# Patient Record
Sex: FEMALE | Race: WHITE | NOT HISPANIC OR LATINO | Employment: FULL TIME | URBAN - METROPOLITAN AREA
[De-identification: names, ages, dates, MRNs, and addresses within clinical notes are randomized per-mention and may not be internally consistent; named-entity substitution may affect disease eponyms.]

---

## 2017-04-04 ENCOUNTER — OFFICE VISIT (OUTPATIENT)
Dept: GASTROENTEROLOGY | Facility: CLINIC | Age: 52
End: 2017-04-04

## 2017-04-04 VITALS — WEIGHT: 151 LBS | BODY MASS INDEX: 25.78 KG/M2 | HEIGHT: 64 IN

## 2017-04-04 DIAGNOSIS — Z83.71 FAMILY HISTORY OF POLYPS IN THE COLON: ICD-10-CM

## 2017-04-04 DIAGNOSIS — K21.9 GASTROESOPHAGEAL REFLUX DISEASE, ESOPHAGITIS PRESENCE NOT SPECIFIED: Primary | ICD-10-CM

## 2017-04-04 DIAGNOSIS — R13.12 OROPHARYNGEAL DYSPHAGIA: ICD-10-CM

## 2017-04-04 PROBLEM — Z83.719 FAMILY HISTORY OF POLYPS IN THE COLON: Status: ACTIVE | Noted: 2017-04-04

## 2017-04-04 PROCEDURE — 99204 OFFICE O/P NEW MOD 45 MIN: CPT | Performed by: INTERNAL MEDICINE

## 2017-04-04 RX ORDER — OMEPRAZOLE 20 MG/1
40 CAPSULE, DELAYED RELEASE ORAL AS NEEDED
COMMUNITY

## 2017-04-04 NOTE — PROGRESS NOTES
Chief Complaint   Patient presents with   • Colonoscopy     and egd     Aracelis WATT is a 51 y.o. female who presents with a Family history of colon polyps and GERD   HPI     A 51-year-old female with history of reflux and gastritis as well as endometriosis and arthritis presenting for evaluation.  Patient reports mother and father both with colon polyps later in life.  Patient reports mother approximately late 60s and father 70s when he developed colon polyps.  Patient reports both for benign and removed and repeat colonoscopy since then for both of them were normal.  Patient is never had colonoscopy screening and presents at this time for that.  Patient does have a history of gastritis documented on upper GI endoscopy but reports of late noting intermittent heartburn which he treats with omeprazole but occasional dysphagia related to the reflux.  Patient reports difficulty swallowing both solids and liquids more in the oropharynx.  Patient denies weight loss or fever or chills denies change in appetite.    Past Medical History:   Diagnosis Date   • Arthritis    • Endometriosis        Current Outpatient Prescriptions:   •  omeprazole (priLOSEC) 20 MG capsule, Take 20 mg by mouth Daily. Prn., Disp: , Rfl:   •  Sod Picosulfate-Mag Ox-Cit Acd (PREPOPIK) 10-3.5-12 MG-GM-GM pack, Take 1 package by mouth Take As Directed., Disp: 1 each, Rfl: 0  Allergies   Allergen Reactions   • Nickel    • Penicillins    • Pineapple      Social History     Social History   • Marital status:      Spouse name: N/A   • Number of children: N/A   • Years of education: N/A     Occupational History   • Not on file.     Social History Main Topics   • Smoking status: Current Every Day Smoker   • Smokeless tobacco: Not on file   • Alcohol use Yes      Comment: socially   • Drug use: Not on file   • Sexual activity: Not on file     Other Topics Concern   • Not on file     Social History Narrative   • No narrative on file     History  reviewed. No pertinent family history.  Review of Systems   Constitutional: Negative.    HENT: Negative.    Eyes: Negative.    Respiratory: Negative.    Cardiovascular: Negative.    Gastrointestinal: Negative.    Endocrine: Negative.    Musculoskeletal: Negative.    Skin: Negative.    Allergic/Immunologic: Negative.    Hematological: Negative.      There were no vitals filed for this visit.  Physical Exam   Constitutional: She is oriented to person, place, and time. She appears well-developed and well-nourished.   HENT:   Head: Normocephalic and atraumatic.   Eyes: Pupils are equal, round, and reactive to light. No scleral icterus.   Neck: Normal range of motion.   Cardiovascular: Normal rate, regular rhythm and normal heart sounds.  Exam reveals no gallop and no friction rub.    No murmur heard.  Pulmonary/Chest: Effort normal and breath sounds normal. She has no wheezes. She has no rales.   Abdominal: Soft. Bowel sounds are normal. She exhibits no shifting dullness, no distension, no pulsatile liver, no fluid wave, no abdominal bruit, no ascites, no pulsatile midline mass and no mass. There is no hepatosplenomegaly. There is no tenderness. There is no rigidity and no guarding. No hernia.   Musculoskeletal: Normal range of motion. She exhibits no edema.   Lymphadenopathy:     She has no cervical adenopathy.   Neurological: She is alert and oriented to person, place, and time. No cranial nerve deficit.   Skin: Skin is warm and dry. No rash noted.   Psychiatric: She has a normal mood and affect. Her behavior is normal. Thought content normal.   Nursing note and vitals reviewed.    Diagnoses and all orders for this visit:    Gastroesophageal reflux disease, esophagitis presence not specified  -     Case Request; Standing  -     Case Request    Oropharyngeal dysphagia  -     Case Request; Standing  -     Case Request    Family history of polyps in the colon  -     Case Request; Standing  -     Case Request    Other  orders  -     omeprazole (priLOSEC) 20 MG capsule; Take 20 mg by mouth Daily. Prn.  -     Obtain informed consent; Standing  -     Implement Anesthesia orders day of procedure.; Standing  -     Sod Picosulfate-Mag Ox-Cit Acd (PREPOPIK) 10-3.5-12 MG-GM-GM pack; Take 1 package by mouth Take As Directed.    Patient is a 51-year-old female with family history significant for colon polyps in both mother and father as well as history of heartburn and reflux as well as gastritis with complaints of intermittent mild dysphagia and heartburn.  Patient taking when necessary omeprazole with some improvement in her heartburn symptoms but intermittent dysphagia continues.  Patient reports swallowing both solids and liquids can be an issue at times.  The above findings would recommend patient undergo colonoscopy screening as well as EGD to evaluate issues and motility possible esophagitis.  Patient scoped number of years ago for upper GI symptoms with severe gastritis and reflux at that time.

## 2017-04-12 ENCOUNTER — APPOINTMENT (OUTPATIENT)
Dept: WOMENS IMAGING | Facility: HOSPITAL | Age: 52
End: 2017-04-12

## 2017-04-12 PROCEDURE — 77067 SCR MAMMO BI INCL CAD: CPT | Performed by: RADIOLOGY

## 2018-07-11 ENCOUNTER — APPOINTMENT (OUTPATIENT)
Dept: WOMENS IMAGING | Facility: HOSPITAL | Age: 53
End: 2018-07-11

## 2018-07-11 PROCEDURE — 77067 SCR MAMMO BI INCL CAD: CPT | Performed by: RADIOLOGY

## 2018-07-11 PROCEDURE — 77063 BREAST TOMOSYNTHESIS BI: CPT | Performed by: RADIOLOGY

## 2018-09-05 ENCOUNTER — OFFICE VISIT (OUTPATIENT)
Dept: INTERNAL MEDICINE | Facility: CLINIC | Age: 53
End: 2018-09-05

## 2018-09-05 VITALS
SYSTOLIC BLOOD PRESSURE: 108 MMHG | RESPIRATION RATE: 18 BRPM | HEIGHT: 64 IN | WEIGHT: 148 LBS | BODY MASS INDEX: 25.27 KG/M2 | DIASTOLIC BLOOD PRESSURE: 84 MMHG | OXYGEN SATURATION: 97 % | HEART RATE: 76 BPM

## 2018-09-05 DIAGNOSIS — M85.80 OSTEOPENIA, UNSPECIFIED LOCATION: ICD-10-CM

## 2018-09-05 DIAGNOSIS — M19.90 ARTHRITIS: ICD-10-CM

## 2018-09-05 DIAGNOSIS — E55.9 VITAMIN D DEFICIENCY: ICD-10-CM

## 2018-09-05 DIAGNOSIS — Z78.0 MENOPAUSE: Primary | ICD-10-CM

## 2018-09-05 DIAGNOSIS — K21.9 GASTROESOPHAGEAL REFLUX DISEASE, ESOPHAGITIS PRESENCE NOT SPECIFIED: ICD-10-CM

## 2018-09-05 PROCEDURE — 99204 OFFICE O/P NEW MOD 45 MIN: CPT | Performed by: INTERNAL MEDICINE

## 2018-09-05 RX ORDER — ACETAMINOPHEN 500 MG
TABLET ORAL
COMMUNITY
End: 2019-10-15

## 2018-09-05 RX ORDER — PAROXETINE 7.5 MG/1
1 CAPSULE ORAL DAILY
Qty: 30 CAPSULE | Refills: 0 | Status: SHIPPED | OUTPATIENT
Start: 2018-09-05 | End: 2018-09-20 | Stop reason: CLARIF

## 2018-09-05 NOTE — PROGRESS NOTES
Subjective   Aracelis Haynes is a 53 y.o. female.     Arthritis   Pertinent negatives include no diarrhea, dysuria or fatigue.        54 y/o who presents to [facility] care.  Has a history of colon polyps and has been evaluated by GI.  Last C Scope and EGD in 2017.      Complains of recent changes in energy level and weight gain. Eats mainly plant based diet and having dry skin.  Increased amount of hot flashes x 5 years and seems to be worsening.  Unable to sleep at night due to night sweats.  Started estrogel last year without benefit.  Now increased dosage without benefi.    Feels like mood is changed. Does a lot of walking with work but no exercise.     She complains of hot flashes that are regular since age 37.  She did have a hysterectomy around age [age].  She did have retention of R ovary. She also thinks she is getting down more consistentlly in her mood.  Tried wellbutrin in past to couple with smoking cessation, but was intolerant. No SI or HI.  Not actively trying to stop smoking.         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  Very active at work, does not exercise outside of work.     Smokes every day.  See Social history.    Had both Flu A and B this past January and has had worsening arthritis since that time.  All the joints hurt and it changes based on the day.  Denies morning stiffness.  Hx of thyroid nodules noted on US, never had any malignancy therein.     Takes omeprazole PRN and Tums PRN for heartburn.  Follows with [facility]. No sicca symptoms described.     Takes naproxen and ibuprofen for arthritis.  Does not take acetaminophen prn as well.    She had recent DexA through OB, osteopenia.  Not yet on calcium and vitamin D.      The following portions of the patient's history were reviewed and updated as appropriate: allergies, current medications, past family history, past medical history, past social history, past surgical history and problem list.    Review  of Systems   Constitutional: Negative.  Negative for fatigue and unexpected weight change.   HENT: Negative.  Negative for congestion, rhinorrhea and sore throat.    Eyes: Negative.    Respiratory: Negative.  Negative for cough and shortness of breath.    Cardiovascular: Negative.  Negative for chest pain and palpitations.   Gastrointestinal: Positive for abdominal pain, constipation and nausea. Negative for diarrhea.   Genitourinary: Negative.  Negative for difficulty urinating, dysuria and flank pain.   Musculoskeletal: Positive for arthritis.   Neurological: Negative.  Negative for dizziness, syncope and light-headedness.   Psychiatric/Behavioral: Negative.        Objective   Physical Exam   Constitutional: She is oriented to person, place, and time. She appears well-developed and well-nourished.   HENT:   Head: Normocephalic and atraumatic.   Right Ear: External ear normal.   Left Ear: External ear normal.   Eyes: Pupils are equal, round, and reactive to light. EOM are normal.   Neck: Normal range of motion. Neck supple. No JVD present. No tracheal deviation present. No thyromegaly present.   Cardiovascular: Normal rate, regular rhythm and normal heart sounds.  Exam reveals no friction rub.    No murmur heard.  Pulmonary/Chest: Effort normal and breath sounds normal.   Lymphadenopathy:     She has no cervical adenopathy.   Neurological: She is alert and oriented to person, place, and time.   Skin: Skin is warm and dry.   Psychiatric: She has a normal mood and affect. Her behavior is normal.   Vitals reviewed.      Assessment/Plan   Aracelis was seen today for establish care and arthritis.    Diagnoses and all orders for this visit:    Menopause  -     PARoxetine Mesylate 7.5 MG capsule; Take 1 tablet by mouth Daily.    Arthritis  -     TSH Rfx On Abnormal To Free T4  -     Comprehensive Metabolic Panel  -     Sedimentation rate, automated  -     C-reactive protein  -     Cyclic Citrul Peptide Antibody, IgG /  IgA  -     VASQUEZ    Gastroesophageal reflux disease, esophagitis presence not specified    Osteopenia, unspecified location    Vitamin D deficiency  -     Vitamin D 25 Hydroxy      Arthritis  - Pt with intermittent symptoms and no evidence of joint destruction on exam  - Hx consistent with sausage digit of R hand  - No skin evidence of psoriasis.   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  - Will perform serologic workup for inflammatory  arthritis.    Trial brisdelle for now, return 4 weeks to see if she has imporved  Cymbalta will be an option for her next time if not doing well, reiterated need for pain control.   Thyroid labs reviewed and normal    Menopause  - Brisdelle for now and limit estrogen replacement due to side effects  - Continue to follow with OBGYN    Osteopenia  - Pt on calcium and Vit D  - FRAX calculated at 10% risk of major fracture  - DEXA q2 years    HCM  - Pt UTD on pap/mammogram and follows with OBGYN (S/p [facility])  - Needs C Scope and EGD  - Immunizations UTD    Antonio Way MD  MEd/Peds PGY-4      Patient seen and examined with resident physician, as well as independently of resident physician. Agree with assessment and plan.

## 2018-09-07 LAB
25(OH)D3+25(OH)D2 SERPL-MCNC: 28.9 NG/ML
ALBUMIN SERPL-MCNC: 4.5 G/DL (ref 3.5–5.2)
ALBUMIN/GLOB SERPL: 2 G/DL
ALP SERPL-CCNC: 86 U/L (ref 40–129)
ALT SERPL-CCNC: 15 U/L (ref 5–33)
ANA SER QL: NEGATIVE
AST SERPL-CCNC: 17 U/L (ref 5–32)
BILIRUB SERPL-MCNC: 0.3 MG/DL (ref 0.2–1.2)
BUN SERPL-MCNC: 9 MG/DL (ref 6–20)
BUN/CREAT SERPL: 11.4 (ref 7–25)
CALCIUM SERPL-MCNC: 9.5 MG/DL (ref 8.6–10.5)
CCP IGA+IGG SERPL IA-ACNC: 9 UNITS (ref 0–19)
CHLORIDE SERPL-SCNC: 103 MMOL/L (ref 98–107)
CO2 SERPL-SCNC: 27.8 MMOL/L (ref 22–29)
CREAT SERPL-MCNC: 0.79 MG/DL (ref 0.57–1)
CRP SERPL-MCNC: 0.05 MG/DL (ref 0–0.5)
ERYTHROCYTE [SEDIMENTATION RATE] IN BLOOD BY WESTERGREN METHOD: 4 MM/HR (ref 0–20)
GLOBULIN SER CALC-MCNC: 2.2 GM/DL
GLUCOSE SERPL-MCNC: 86 MG/DL (ref 65–99)
POTASSIUM SERPL-SCNC: 4.5 MMOL/L (ref 3.5–5.2)
PROT SERPL-MCNC: 6.7 G/DL (ref 6–8.5)
SODIUM SERPL-SCNC: 143 MMOL/L (ref 136–145)
TSH SERPL DL<=0.005 MIU/L-ACNC: 0.48 MIU/ML (ref 0.27–4.2)

## 2018-09-20 RX ORDER — PAROXETINE 10 MG/1
10 TABLET, FILM COATED ORAL EVERY MORNING
Qty: 30 TABLET | Refills: 2 | Status: SHIPPED | OUTPATIENT
Start: 2018-09-20 | End: 2019-10-15

## 2019-07-31 ENCOUNTER — APPOINTMENT (OUTPATIENT)
Dept: WOMENS IMAGING | Facility: HOSPITAL | Age: 54
End: 2019-07-31

## 2019-07-31 PROCEDURE — 77063 BREAST TOMOSYNTHESIS BI: CPT | Performed by: RADIOLOGY

## 2019-07-31 PROCEDURE — 77067 SCR MAMMO BI INCL CAD: CPT | Performed by: RADIOLOGY

## 2019-10-15 ENCOUNTER — APPOINTMENT (OUTPATIENT)
Dept: GENERAL RADIOLOGY | Facility: HOSPITAL | Age: 54
End: 2019-10-15

## 2019-10-15 ENCOUNTER — HOSPITAL ENCOUNTER (EMERGENCY)
Facility: HOSPITAL | Age: 54
Discharge: HOME | End: 2019-10-15
Attending: EMERGENCY MEDICINE | Admitting: EMERGENCY MEDICINE

## 2019-10-15 VITALS
TEMPERATURE: 96.4 F | BODY MASS INDEX: 26.88 KG/M2 | DIASTOLIC BLOOD PRESSURE: 71 MMHG | RESPIRATION RATE: 16 BRPM | WEIGHT: 151.7 LBS | SYSTOLIC BLOOD PRESSURE: 109 MMHG | OXYGEN SATURATION: 98 % | HEIGHT: 63 IN | HEART RATE: 73 BPM

## 2019-10-15 DIAGNOSIS — R07.89 CHEST WALL PAIN: Primary | ICD-10-CM

## 2019-10-15 LAB
ALBUMIN SERPL-MCNC: 4.4 G/DL (ref 3.5–5.2)
ALBUMIN/GLOB SERPL: 2.2 G/DL
ALP SERPL-CCNC: 76 U/L (ref 39–117)
ALT SERPL W P-5'-P-CCNC: 11 U/L (ref 1–33)
ANION GAP SERPL CALCULATED.3IONS-SCNC: 11.4 MMOL/L (ref 5–15)
AST SERPL-CCNC: 12 U/L (ref 1–32)
BASOPHILS # BLD AUTO: 0.07 10*3/MM3 (ref 0–0.2)
BASOPHILS NFR BLD AUTO: 1 % (ref 0–1.5)
BILIRUB SERPL-MCNC: 0.3 MG/DL (ref 0.2–1.2)
BUN BLD-MCNC: 12 MG/DL (ref 6–20)
BUN/CREAT SERPL: 14.6 (ref 7–25)
CALCIUM SPEC-SCNC: 8.9 MG/DL (ref 8.6–10.5)
CHLORIDE SERPL-SCNC: 101 MMOL/L (ref 98–107)
CO2 SERPL-SCNC: 26.6 MMOL/L (ref 22–29)
CREAT BLD-MCNC: 0.82 MG/DL (ref 0.57–1)
D DIMER PPP FEU-MCNC: 0.27 MCGFEU/ML (ref 0–0.49)
DEPRECATED RDW RBC AUTO: 41.9 FL (ref 37–54)
EOSINOPHIL # BLD AUTO: 0.17 10*3/MM3 (ref 0–0.4)
EOSINOPHIL NFR BLD AUTO: 2.4 % (ref 0.3–6.2)
ERYTHROCYTE [DISTWIDTH] IN BLOOD BY AUTOMATED COUNT: 12.1 % (ref 12.3–15.4)
GFR SERPL CREATININE-BSD FRML MDRD: 73 ML/MIN/1.73
GLOBULIN UR ELPH-MCNC: 2 GM/DL
GLUCOSE BLD-MCNC: 94 MG/DL (ref 65–99)
HCT VFR BLD AUTO: 42.8 % (ref 34–46.6)
HGB BLD-MCNC: 14.4 G/DL (ref 12–15.9)
HOLD SPECIMEN: NORMAL
HOLD SPECIMEN: NORMAL
IMM GRANULOCYTES # BLD AUTO: 0.02 10*3/MM3 (ref 0–0.05)
IMM GRANULOCYTES NFR BLD AUTO: 0.3 % (ref 0–0.5)
LYMPHOCYTES # BLD AUTO: 1.74 10*3/MM3 (ref 0.7–3.1)
LYMPHOCYTES NFR BLD AUTO: 24.8 % (ref 19.6–45.3)
MCH RBC QN AUTO: 31.5 PG (ref 26.6–33)
MCHC RBC AUTO-ENTMCNC: 33.6 G/DL (ref 31.5–35.7)
MCV RBC AUTO: 93.7 FL (ref 79–97)
MONOCYTES # BLD AUTO: 0.55 10*3/MM3 (ref 0.1–0.9)
MONOCYTES NFR BLD AUTO: 7.8 % (ref 5–12)
NEUTROPHILS # BLD AUTO: 4.47 10*3/MM3 (ref 1.7–7)
NEUTROPHILS NFR BLD AUTO: 63.7 % (ref 42.7–76)
NRBC BLD AUTO-RTO: 0 /100 WBC (ref 0–0.2)
PLATELET # BLD AUTO: 241 10*3/MM3 (ref 140–450)
PMV BLD AUTO: 10.5 FL (ref 6–12)
POTASSIUM BLD-SCNC: 4.3 MMOL/L (ref 3.5–5.2)
PROT SERPL-MCNC: 6.4 G/DL (ref 6–8.5)
RBC # BLD AUTO: 4.57 10*6/MM3 (ref 3.77–5.28)
SODIUM BLD-SCNC: 139 MMOL/L (ref 136–145)
TROPONIN T SERPL-MCNC: <0.01 NG/ML (ref 0–0.03)
WBC NRBC COR # BLD: 7.02 10*3/MM3 (ref 3.4–10.8)
WHOLE BLOOD HOLD SPECIMEN: NORMAL
WHOLE BLOOD HOLD SPECIMEN: NORMAL

## 2019-10-15 PROCEDURE — 25010000002 KETOROLAC TROMETHAMINE PER 15 MG: Performed by: EMERGENCY MEDICINE

## 2019-10-15 PROCEDURE — 99284 EMERGENCY DEPT VISIT MOD MDM: CPT

## 2019-10-15 PROCEDURE — 93005 ELECTROCARDIOGRAM TRACING: CPT

## 2019-10-15 PROCEDURE — 84484 ASSAY OF TROPONIN QUANT: CPT | Performed by: EMERGENCY MEDICINE

## 2019-10-15 PROCEDURE — 93010 ELECTROCARDIOGRAM REPORT: CPT | Performed by: INTERNAL MEDICINE

## 2019-10-15 PROCEDURE — 96374 THER/PROPH/DIAG INJ IV PUSH: CPT

## 2019-10-15 PROCEDURE — 80053 COMPREHEN METABOLIC PANEL: CPT | Performed by: EMERGENCY MEDICINE

## 2019-10-15 PROCEDURE — 85025 COMPLETE CBC W/AUTO DIFF WBC: CPT | Performed by: EMERGENCY MEDICINE

## 2019-10-15 PROCEDURE — 85379 FIBRIN DEGRADATION QUANT: CPT | Performed by: EMERGENCY MEDICINE

## 2019-10-15 PROCEDURE — 71046 X-RAY EXAM CHEST 2 VIEWS: CPT

## 2019-10-15 PROCEDURE — 93005 ELECTROCARDIOGRAM TRACING: CPT | Performed by: EMERGENCY MEDICINE

## 2019-10-15 RX ORDER — SODIUM CHLORIDE 0.9 % (FLUSH) 0.9 %
10 SYRINGE (ML) INJECTION AS NEEDED
Status: DISCONTINUED | OUTPATIENT
Start: 2019-10-15 | End: 2019-10-15 | Stop reason: HOSPADM

## 2019-10-15 RX ORDER — IBUPROFEN 600 MG/1
600 TABLET ORAL EVERY 8 HOURS PRN
Qty: 21 TABLET | Refills: 0 | Status: SHIPPED | OUTPATIENT
Start: 2019-10-15 | End: 2021-04-13 | Stop reason: HOSPADM

## 2019-10-15 RX ORDER — KETOROLAC TROMETHAMINE 15 MG/ML
15 INJECTION, SOLUTION INTRAMUSCULAR; INTRAVENOUS ONCE
Status: COMPLETED | OUTPATIENT
Start: 2019-10-15 | End: 2019-10-15

## 2019-10-15 RX ADMIN — KETOROLAC TROMETHAMINE 15 MG: 15 INJECTION, SOLUTION INTRAMUSCULAR; INTRAVENOUS at 12:01

## 2019-10-15 NOTE — ED PROVIDER NOTES
" EMERGENCY DEPARTMENT ENCOUNTER    CHIEF COMPLAINT  Chief Complaint: Chest pain  History given by: Pt  History limited by: none  Room Number:   PMD: Yovanny Choi MD      HPI:  Pt is a 54 y.o. female who presents complaining of intermittent \"aching\" right upper chest pain that radiates into her right upper shoulder that began yesterday at 1630. She states it lasted until  and came back earlier today while at work. It is worse with a deep inspiration and cough. It is also worse with bending to the right. She states she recently finished moving homes. She also states she has had a productive cough with clear/yellow sputum for two weeks. She was diagnosed with a URI and was started on Zithromax. She reports right calf pain for four weeks but denies any leg swelling, nausea, vomiting, or fever. She reports FHx of father and mother with heart disease. She states she smokes half a pack a day.       PAST MEDICAL HISTORY  Active Ambulatory Problems     Diagnosis Date Noted   • GERD (gastroesophageal reflux disease) 2017   • Oropharyngeal dysphagia 2017   • Family history of polyps in the colon 2017   • Menopause 2018   • Arthritis 2018   • Osteopenia 2018   • Vitamin D deficiency 2018     Resolved Ambulatory Problems     Diagnosis Date Noted   • No Resolved Ambulatory Problems     Past Medical History:   Diagnosis Date   • Arthritis    • Endometriosis        PAST SURGICAL HISTORY  Past Surgical History:   Procedure Laterality Date   •  SECTION     •  SECTION     • CHOLECYSTECTOMY     • FULGURATION ENDOMETRIOSIS     • HYSTERECTOMY     • TUBAL ABDOMINAL LIGATION         FAMILY HISTORY  Family History   Problem Relation Age of Onset   • Heart disease Mother    • Hypertension Mother    • Depression Mother    • Heart disease Father    • Hypertension Father    • Kidney nephrosis Father        SOCIAL HISTORY  Social History     Socioeconomic History   • Marital " status:      Spouse name: Not on file   • Number of children: Not on file   • Years of education: Not on file   • Highest education level: Not on file   Tobacco Use   • Smoking status: Current Every Day Smoker     Packs/day: 0.50     Types: Cigarettes   • Smokeless tobacco: Never Used   Substance and Sexual Activity   • Alcohol use: Yes     Comment: socially       ALLERGIES  Penicillins; Pineapple; and Nickel    REVIEW OF SYSTEMS  Review of Systems   Constitutional: Negative for fever.   HENT: Negative for sore throat.    Eyes: Negative.    Respiratory: Positive for cough. Negative for shortness of breath.    Cardiovascular: Positive for chest pain. Negative for leg swelling.   Gastrointestinal: Negative for abdominal pain, diarrhea and vomiting.   Genitourinary: Negative for dysuria.   Musculoskeletal: Positive for myalgias (right calf pain for four weeks). Negative for neck pain.   Skin: Negative for rash.   Allergic/Immunologic: Negative.    Neurological: Negative for weakness, numbness and headaches.   Hematological: Negative.    Psychiatric/Behavioral: Negative.    All other systems reviewed and are negative.      PHYSICAL EXAM  ED Triage Vitals   Temp Heart Rate Resp BP SpO2   10/15/19 0927 10/15/19 0927 10/15/19 0927 10/15/19 1032 10/15/19 0927   96.4 °F (35.8 °C) 99 16 121/75 97 %      Temp src Heart Rate Source Patient Position BP Location FiO2 (%)   10/15/19 0927 10/15/19 0927 -- -- --   Tympanic Monitor          Physical Exam   Constitutional: She is oriented to person, place, and time. No distress.   HENT:   Head: Normocephalic and atraumatic.   Mouth/Throat: Oropharynx is clear and moist. No oropharyngeal exudate.   Eyes: EOM are normal. Pupils are equal, round, and reactive to light.   Neck: Normal range of motion. Neck supple.   Cardiovascular: Normal rate, regular rhythm and normal heart sounds.   No murmur heard.  Pulmonary/Chest: Effort normal and breath sounds normal. No respiratory  distress. She has no wheezes. She has no rales.   Reproducible with palpation of right chest wall and moving her right shoulder against resistance.    Abdominal: Soft. Bowel sounds are normal. She exhibits no distension. There is no tenderness.   Musculoskeletal: Normal range of motion. She exhibits no edema (pedal) or tenderness (calf).   No jyoti's sign   Lymphadenopathy:     She has no cervical adenopathy.   Neurological: She is alert and oriented to person, place, and time. She has normal sensation and normal strength.   Skin: Skin is warm and dry. No rash noted.   Psychiatric: Mood and affect normal.   Nursing note and vitals reviewed.      LAB RESULTS  Lab Results (last 24 hours)     Procedure Component Value Units Date/Time    CBC & Differential [[id number]] Collected:  10/15/19 1041    Specimen:  Blood Updated:  10/15/19 1207    Narrative:       The following orders were created for panel order CBC & Differential.  Procedure                               Abnormality         Status                     ---------                               -----------         ------                     CBC Auto Differential[[id number]]        Abnormal            Final result                 Please view results for these tests on the individual orders.    Comprehensive Metabolic Panel [[id number]] Collected:  10/15/19 1041    Specimen:  Blood Updated:  10/15/19 1158     Glucose 94 mg/dL      BUN 12 mg/dL      Creatinine 0.82 mg/dL      Sodium 139 mmol/L      Potassium 4.3 mmol/L      Chloride 101 mmol/L      CO2 26.6 mmol/L      Calcium 8.9 mg/dL      Total Protein 6.4 g/dL      Albumin 4.40 g/dL      ALT (SGPT) 11 U/L      AST (SGOT) 12 U/L      Alkaline Phosphatase 76 U/L      Total Bilirubin 0.3 mg/dL      eGFR Non African Amer 73 mL/min/1.73      Globulin 2.0 gm/dL      A/G Ratio 2.2 g/dL      BUN/Creatinine Ratio 14.6     Anion Gap 11.4 mmol/L     Narrative:       GFR Normal >60  Chronic Kidney Disease <60  Kidney Failure  <15    Troponin [[id number]]  (Normal) Collected:  10/15/19 1041    Specimen:  Blood Updated:  10/15/19 1202     Troponin T <0.010 ng/mL     Narrative:       Troponin T Reference Range:  <= 0.03 ng/mL-   Negative for AMI  >0.03 ng/mL-     Abnormal for myocardial necrosis.  Clinicians would have to utilize clinical acumen, EKG, Troponin and serial changes to determine if it is an Acute Myocardial Infarction or myocardial injury due to an underlying chronic condition.     D-dimer, Quantitative [[id number]]  (Normal) Collected:  10/15/19 1041    Specimen:  Blood Updated:  10/15/19 1215     D-Dimer, Quantitative 0.27 MCGFEU/mL     Narrative:       The Stago D-Dimer test used in conjunction with a clinical pretest probability (PTP) assessment model, has been approved by the FDA to rule out the presence of venous thromboembolism (VTE) in outpatients suspected of deep venous thrombosis (DVT) or pulmonary embolism (PE). The cut-off for negative predictive value is <0.50 MCGFEU/mL.    CBC Auto Differential [[id number]]  (Abnormal) Collected:  10/15/19 1041    Specimen:  Blood Updated:  10/15/19 1207     WBC 7.02 10*3/mm3      RBC 4.57 10*6/mm3      Hemoglobin 14.4 g/dL      Hematocrit 42.8 %      MCV 93.7 fL      MCH 31.5 pg      MCHC 33.6 g/dL      RDW 12.1 %      RDW-SD 41.9 fl      MPV 10.5 fL      Platelets 241 10*3/mm3      Neutrophil % 63.7 %      Lymphocyte % 24.8 %      Monocyte % 7.8 %      Eosinophil % 2.4 %      Basophil % 1.0 %      Immature Grans % 0.3 %      Neutrophils, Absolute 4.47 10*3/mm3      Lymphocytes, Absolute 1.74 10*3/mm3      Monocytes, Absolute 0.55 10*3/mm3      Eosinophils, Absolute 0.17 10*3/mm3      Basophils, Absolute 0.07 10*3/mm3      Immature Grans, Absolute 0.02 10*3/mm3      nRBC 0.0 /100 WBC           I ordered the above labs and reviewed the results    RADIOLOGY  XR Chest 2 View   Final Result       negative acute    I ordered the above noted radiological studies. Interpreted by  radiologist. Discussed with radiologist (). Reviewed by me in PACS.       PROCEDURES  Procedures    HEART SCORE    History Slightly or non-suspicious (0)  ECG Normal (0)  Age 46-65 (1)  Risk factors 1 or 2 (1)  Troponin < or = Normal limit (0)    This patient's HEART score is 2    HEART Score Key:  Scores 0-3: 0.9-1.7% risk of adverse cardiac event. In the HEART Score study, these patients were discharged (0.99% in the retrospective study, 1.7% in the prospective study)  Scores 4-6: 12-16.6% risk of adverse cardiac event. In the HEART Score study, these patients were admitted to the hospital. (11.6% retrospective, 16.6% prospective)  Scores ?7: 50-65% risk of adverse cardiac event. In the HEART Score study, these patients were candidates for early invasive measures. (65.2% retrospective, 50.1% prospective)    EKG          EKG time: 0931  Rhythm/Rate: NSR, 83  P waves and LA: short DANGELO  QRS, axis: nml   ST and T waves: nml     Interpreted Contemporaneously by me, independently viewed  No prior    PROGRESS AND CONSULTS       1304 Rechecked pt. Pt is resting comfortably in NAD. Notified pt of unremarkable lab work results, including negative troponin and D-Dimer; unremarkable EKG results; and negative acute CXR results. Her chest pain is reproducible with palpation of right chest and is worse with movement. 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 Informed pt concern for chest wall pain. Discussed the plan to discharge the pt home with prescriptions for NSAIDS. I instructed the pt rest and take it easy and to f/u with her PCP. Pt understands and agrees with the plan, all questions answered.      MEDICAL DECISION MAKING  Results were reviewed/discussed with the patient and they were also made aware of online access. Pt also made aware that some labs, such as cultures, will not be resulted during ER visit and follow up with PMD is necessary.     MDM  Number of Diagnoses or Management Options   Chest wall pain:      Amount  and/or Complexity of Data Reviewed  Tests in the radiology section of CPT®:  ordered and reviewed (CXR negative )  Tests in the medicine section of CPT®:  ordered and reviewed (See EKG results in procedure. )           DIAGNOSIS  Final diagnoses:   Chest wall pain       DISPOSITION  DISCHARGE    Patient discharged in stable condition.    Reviewed implications of results, diagnosis, meds, responsibility to follow up, warning signs and symptoms of possible worsening, potential complications and reasons to return to ER.    Patient/Family voiced understanding of above instructions.    Discussed plan for discharge, as there is no emergent indication for admission. Patient referred to primary care provider for BP management due to today's BP. Pt/family is agreeable and understands need for follow up and repeat testing.  Pt is aware that discharge does not mean that nothing is wrong but it indicates no emergency is present that requires admission and they must continue care with follow-up as given below or physician of their choice.     FOLLOW-UP  Yovanny Choi MD  [address] [address]  [phone]    Schedule an appointment as soon as possible for a visit            Medication List      New Prescriptions    ibuprofen 600 MG tablet  Commonly known as:  ADVIL,MOTRIN  Take 1 tablet by mouth Every 8 (Eight) Hours As Needed for Moderate Pain .              Latest Documented Vital Signs:  As of 1:34 PM  BP- 109/71 HR- 73 Temp- 96.4 °F (35.8 °C) (Tympanic) O2 sat- 98%    --  Documentation assistance provided by vincent Hampton for Dr. Espana.  Information recorded by the scribe was done at my direction and has been verified and validated by me.            Caleb Hampton  10/15/19 1808    Fredi Espana MD  10/15/19 1921

## 2020-08-05 ENCOUNTER — APPOINTMENT (OUTPATIENT)
Dept: WOMENS IMAGING | Facility: HOSPITAL | Age: 55
End: 2020-08-05

## 2020-08-05 PROCEDURE — 77063 BREAST TOMOSYNTHESIS BI: CPT | Performed by: RADIOLOGY

## 2020-08-05 PROCEDURE — 77067 SCR MAMMO BI INCL CAD: CPT | Performed by: RADIOLOGY

## 2021-01-05 ENCOUNTER — IMMUNIZATION (OUTPATIENT)
Dept: VACCINE CLINIC | Facility: HOSPITAL | Age: 56
End: 2021-01-05

## 2021-01-05 PROCEDURE — 0001A: CPT | Performed by: INTERNAL MEDICINE

## 2021-01-05 PROCEDURE — 91300 HC SARSCOV02 VAC 30MCG/0.3ML IM: CPT | Performed by: INTERNAL MEDICINE

## 2021-01-26 ENCOUNTER — IMMUNIZATION (OUTPATIENT)
Dept: VACCINE CLINIC | Facility: HOSPITAL | Age: 56
End: 2021-01-26

## 2021-01-26 PROCEDURE — 0002A: CPT | Performed by: INTERNAL MEDICINE

## 2021-01-26 PROCEDURE — 91300 HC SARSCOV02 VAC 30MCG/0.3ML IM: CPT | Performed by: INTERNAL MEDICINE

## 2021-02-10 NOTE — PATIENT INSTRUCTIONS
Assessment/Plan   Aracelis was seen today for establish care and arthritis.    Diagnoses and all orders for this visit:    Menopause  -     PARoxetine Mesylate 7.5 MG capsule; Take 1 tablet by mouth Daily.    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  Trial brisdelle for now, return 4 weeks to see if she has imporved  Cymbalta will be an option for her next time if not doing well, reiterated need for pain control.        warm

## 2021-04-12 ENCOUNTER — HOSPITAL ENCOUNTER (EMERGENCY)
Dept: CARDIOLOGY | Facility: HOSPITAL | Age: 56
Discharge: HOME | End: 2021-04-12

## 2021-04-12 ENCOUNTER — HOSPITAL ENCOUNTER (EMERGENCY)
Facility: HOSPITAL | Age: 56
Discharge: HOME | End: 2021-04-12
Attending: EMERGENCY MEDICINE

## 2021-04-12 ENCOUNTER — APPOINTMENT (OUTPATIENT)
Dept: GENERAL RADIOLOGY | Facility: HOSPITAL | Age: 56
End: 2021-04-12

## 2021-04-12 ENCOUNTER — LAB (OUTPATIENT)
Dept: LAB | Facility: HOSPITAL | Age: 56
End: 2021-04-12

## 2021-04-12 ENCOUNTER — TRANSCRIBE ORDERS (OUTPATIENT)
Dept: ADMINISTRATIVE | Facility: HOSPITAL | Age: 56
End: 2021-04-12

## 2021-04-12 VITALS
RESPIRATION RATE: 18 BRPM | TEMPERATURE: 97.5 F | OXYGEN SATURATION: 100 % | HEART RATE: 80 BPM | WEIGHT: 155 LBS | BODY MASS INDEX: 27.46 KG/M2 | DIASTOLIC BLOOD PRESSURE: 76 MMHG | HEIGHT: 63 IN | SYSTOLIC BLOOD PRESSURE: 116 MMHG

## 2021-04-12 DIAGNOSIS — Z01.818 PRE-OP EXAM: Primary | ICD-10-CM

## 2021-04-12 DIAGNOSIS — Z01.818 PRE-OP EXAM: ICD-10-CM

## 2021-04-12 DIAGNOSIS — M20.012 MALLET DEFORMITY OF LEFT LITTLE FINGER: Primary | ICD-10-CM

## 2021-04-12 DIAGNOSIS — S62.637A DISPLACED FRACTURE OF DISTAL PHALANX OF LEFT LITTLE FINGER, INITIAL ENCOUNTER FOR CLOSED FRACTURE: ICD-10-CM

## 2021-04-12 LAB
ALBUMIN SERPL-MCNC: 4.4 G/DL (ref 3.5–5.2)
ALBUMIN/GLOB SERPL: 2 G/DL
ALP SERPL-CCNC: 85 U/L (ref 39–117)
ALT SERPL W P-5'-P-CCNC: 13 U/L (ref 1–33)
ANION GAP SERPL CALCULATED.3IONS-SCNC: 13.7 MMOL/L (ref 5–15)
AST SERPL-CCNC: 16 U/L (ref 1–32)
BILIRUB SERPL-MCNC: 0.4 MG/DL (ref 0–1.2)
BUN SERPL-MCNC: 10 MG/DL (ref 6–20)
BUN/CREAT SERPL: 13.2 (ref 7–25)
CALCIUM SPEC-SCNC: 9.4 MG/DL (ref 8.6–10.5)
CHLORIDE SERPL-SCNC: 106 MMOL/L (ref 98–107)
CO2 SERPL-SCNC: 23.3 MMOL/L (ref 22–29)
CREAT SERPL-MCNC: 0.76 MG/DL (ref 0.57–1)
GFR SERPL CREATININE-BSD FRML MDRD: 80 ML/MIN/1.73
GLOBULIN UR ELPH-MCNC: 2.2 GM/DL
GLUCOSE SERPL-MCNC: 92 MG/DL (ref 65–99)
POTASSIUM SERPL-SCNC: 4.5 MMOL/L (ref 3.5–5.2)
PROT SERPL-MCNC: 6.6 G/DL (ref 6–8.5)
QT INTERVAL: 377 MS
SARS-COV-2 ORF1AB RESP QL NAA+PROBE: NOT DETECTED
SODIUM SERPL-SCNC: 143 MMOL/L (ref 136–145)

## 2021-04-12 PROCEDURE — U0004 COV-19 TEST NON-CDC HGH THRU: HCPCS

## 2021-04-12 PROCEDURE — 80053 COMPREHEN METABOLIC PANEL: CPT

## 2021-04-12 PROCEDURE — 36415 COLL VENOUS BLD VENIPUNCTURE: CPT

## 2021-04-12 PROCEDURE — 93010 ELECTROCARDIOGRAM REPORT: CPT | Performed by: INTERNAL MEDICINE

## 2021-04-12 PROCEDURE — 99283 EMERGENCY DEPT VISIT LOW MDM: CPT

## 2021-04-12 PROCEDURE — 93005 ELECTROCARDIOGRAM TRACING: CPT | Performed by: ORTHOPAEDIC SURGERY

## 2021-04-12 PROCEDURE — 73140 X-RAY EXAM OF FINGER(S): CPT

## 2021-04-12 PROCEDURE — C9803 HOPD COVID-19 SPEC COLLECT: HCPCS

## 2021-04-12 NOTE — ED PROVIDER NOTES
" EMERGENCY DEPARTMENT ENCOUNTER    Room Number:    Date seen:  2021  Time seen: 06:58 EDT  PCP: Yovanny Choi MD  Historian: patient      HPI:  Chief Complaint: left pinky finger pain    A complete HPI/ROS/PMH/PSH/SH/FH are unobtainable due to: none    Context: Aracelis Haynes is a 55 y.o. female who presents to the ED for evaluation of pain and swelling in her left pinky finger that began around 5:00 this morning acutely when she was trying to clean up an accident that her dog had at home and as she was scrubbing the floor with the towel she heard her finger \"pop.\"  She gradually developed pain and swelling in the area since.  It is worse with palpation and attempts at range of motion and holding it still makes it feel somewhat better.  Pain is moderate, no other injuries at this time.  No prior injuries to this area, she is right-hand dominant.        PAST MEDICAL HISTORY  Active Ambulatory Problems     Diagnosis Date Noted   • GERD (gastroesophageal reflux disease) 2017   • Oropharyngeal dysphagia 2017   • Family history of polyps in the colon 2017   • Menopause 2018   • Arthritis 2018   • Osteopenia 2018   • Vitamin D deficiency 2018     Resolved Ambulatory Problems     Diagnosis Date Noted   • No Resolved Ambulatory Problems     Past Medical History:   Diagnosis Date   • Endometriosis          PAST SURGICAL HISTORY  Past Surgical History:   Procedure Laterality Date   •  SECTION     •  SECTION     • CHOLECYSTECTOMY     • FULGURATION ENDOMETRIOSIS     • HYSTERECTOMY     • TUBAL ABDOMINAL LIGATION           FAMILY HISTORY  Family History   Problem Relation Age of Onset   • Heart disease Mother    • Hypertension Mother    • Depression Mother    • Heart disease Father    • Hypertension Father    • Kidney nephrosis Father          SOCIAL HISTORY  Social History     Socioeconomic History   • Marital status:      Spouse name: Not on file   • " Number of children: Not on file   • Years of education: Not on file   • Highest education level: Not on file   Tobacco Use   • Smoking status: Current Every Day Smoker     Packs/day: 0.50     Types: Cigarettes   • Smokeless tobacco: Never Used   Vaping Use   • Vaping Use: Never used   Substance and Sexual Activity   • Alcohol use: Yes     Comment: socially   • Drug use: Defer   • Sexual activity: Defer         ALLERGIES  Penicillins, Pineapple, and Nickel        REVIEW OF SYSTEMS  Review of Systems     All systems reviewed and negative except for those discussed in HPI.       PHYSICAL EXAM  ED Triage Vitals   Temp Heart Rate Resp BP SpO2   04/12/21 0617 04/12/21 0617 04/12/21 0617 04/12/21 0622 04/12/21 0617   97.5 °F (36.4 °C) 90 16 120/92 100 %      Temp src Heart Rate Source Patient Position BP Location FiO2 (%)   04/12/21 0617 04/12/21 0617 -- -- --   Tympanic Monitor            GENERAL: not distressed  HENT: atraumatic  EYES: no scleral icterus  CV:  regular rate  RESPIRATORY: normal effort  ABDOMEN: Nondistended  MUSCULOSKELETAL: Mallet deformity of the left fifth finger with tenderness edema and mild ecchymosis at the left fifth finger DIP.  Sensation intact distally, cap refill is brisk.  There is no other tenderness to any of the fingers hand or wrist of the left upper extremity.  Radial pulse is 2+.  NEURO: alert, moves all extremities, follows commands  SKIN: warm, dry    Vital signs and nursing notes reviewed.        RADIOLOGY  XR Finger 2+ View Left   Final Result   Dorsal avulsion fracture at the base of the left fifth digit   distal phalanx.       This report was finalized on 4/12/2021 7:43 AM by Dr. Jewel Lee M.D.              I ordered the above noted radiological studies. Reviewed by me and discussed with radiologist.  See dictation for official radiology interpretation.    PROCEDURES  Procedures        MEDICATIONS GIVEN IN ER  Medications - No data to display          PROGRESS AND  CONSULTS    DDX includes but not limited to sprain, fracture, dislocation, avulsion    ED Course as of Apr 12 1551   Mon Apr 12, 2021   0825 My interpretation of the left fifth finger x-rays shows an avulsion fracture from the proximal end of the distal phalanx on the dorsal aspect, consistent with mallet finger    [KA]   0825 I reassessed the patient, she is resting comfortably.  I discussed her x-ray findings and need to follow-up with hand orthopedics.  She refused to see Dr. Gilbert, I will give her his information for follow-up and she should call today to schedule.  I recommended rest ice elevation and splinting, Tylenol or ibuprofen as needed for pain and she can return to the ER as needed.    [KA]   0829 Splint Application:  Splint Type: alumafoam finger splint to PIP and DIP  Indication: left fifth mallet finger avulsion fracture  Splint placed by me  Post splint application:   1) neurovascularly intact   2) good position  Discussed splint care with patient  Discussed PMD/orthopedic follow up      [KA]      ED Course User Index  [KA] Radha Cooper PA        Reviewed pt's history and workup with Dr. Friedman.  After a bedside evaluation; they agree with the plan of care      Patient was placed in face mask in first look. Patient was wearing facemask each time I entered the room and throughout our encounter. I wore protective equipment throughout this patient encounter including a face mask, eye shield and gloves. Hand hygiene was performed before donning protective equipment and after removal when leaving the room.        DIAGNOSIS  Final diagnoses:   Mallet deformity of left little finger   Displaced fracture of distal phalanx of left little finger, initial encounter for closed fracture         Follow Up:  Zain Gilbert MD  [address]  [place] [id number] [phone]    Schedule an appointment as soon as possible for a visit       Carl Watt MD  [address]  [place] [address]  [phone]          Gurinder Beckwith MD  [address]  [facility] [address] [address]  [place] [address]  [phone]            RX:     Medication List      No changes were made to your prescriptions during this visit.           Latest Documented Vital Signs:  As of 15:51 EDT  BP- 116/76 HR- 80 Temp- 97.5 °F (36.4 °C) (Tympanic) O2 sat- 100%       Radha Cooper PA  04/12/21 4146

## 2021-04-12 NOTE — DISCHARGE INSTRUCTIONS
Follow-up with the hand surgeon listed or one of your choice, call today to schedule.  Wear the splint all of the time.  Ice and elevate for pain and swelling, ibuprofen or Tylenol as needed for pain.  Follow package instructions.  Return to the ER as needed.

## 2021-04-12 NOTE — ED PROVIDER NOTES
Pt presents to the ED c/o  left finger pain and deformity that occurred while she was scrubbing the floor with a towel and felt her finger pop.      On exam,   General: Awake, alert, no acute distress  HEENT: EOMI  Pulm: Symmetric chest rise, nonlabored breathing  CV: Regular rate and rhythm  GI: Non-distended  MSK: Mallet finger deformity of the left fifth digit distally with what appears to be some possible bunching of the extensor tendon on the radial aspect of the mid finger  Skin: Warm, dry  Neuro: Alert and oriented x 3, moving all extremities, no focal deficits  Psych: Calm, cooperative    Vital signs and nursing notes reviewed.       Surgical mask, protective eye goggles, and gloves used during this encounter. Patient in surgical mask.      Plan:   ED Course as of Apr 12 1606 Mon Apr 12, 2021   0825 My interpretation of the left fifth finger x-rays shows an avulsion fracture from the proximal end of the distal phalanx on the dorsal aspect, consistent with mallet finger    [KA]   0825 I reassessed the patient, she is resting comfortably.  I discussed her x-ray findings and need to follow-up with hand orthopedics.  She refused to see Dr. Gilbert, I will give her his information for follow-up and she should call today to schedule.  I recommended rest ice elevation and splinting, Tylenol or ibuprofen as needed for pain and she can return to the ER as needed.    [KA]   2361 Splint Application:  Splint Type: alumafoam finger splint to PIP and DIP  Indication: left fifth mallet finger avulsion fracture  Splint placed by me  Post splint application:   1) neurovascularly intact   2) good position  Discussed splint care with patient  Discussed PMD/orthopedic follow up      [KA]      ED Course User Index  [KA] Radha Cooper PA     X-ray findings of the avulsion fracture noted, plan for splinting in full extension allowing full range of motion of the PIP joint with close outpatient hand surgery follow-up.  ED  return for worsening symptoms as needed.     Attestation:  The EVON and I have discussed this patient's history, physical exam, and treatment plan.  I have reviewed the documentation and personally had a face to face interaction with the patient. I affirm the documentation and agree with the treatment and plan.  The attached note describes my personal findings.          Jerry Friedman MD  04/12/21 5348

## 2021-04-12 NOTE — ED NOTES
Pt ambulatory to triage from home with c/o possible fractured left small finger.  States was cleaning a mess on the carpet (from the dog) and she heard and felt a crack in her finger.  Deformity noted to left small finger.  Pt provided with mask in triage.  Triage personnel wore appropriate PPE       Leida Abebe RN  04/12/21 0680

## 2021-04-13 ENCOUNTER — ANESTHESIA (OUTPATIENT)
Dept: PERIOP | Facility: HOSPITAL | Age: 56
End: 2021-04-13

## 2021-04-13 ENCOUNTER — APPOINTMENT (OUTPATIENT)
Dept: GENERAL RADIOLOGY | Facility: HOSPITAL | Age: 56
End: 2021-04-13

## 2021-04-13 ENCOUNTER — EPISODE CHANGES (OUTPATIENT)
Dept: CASE MANAGEMENT | Facility: OTHER | Age: 56
End: 2021-04-13

## 2021-04-13 ENCOUNTER — ANESTHESIA EVENT (OUTPATIENT)
Dept: PERIOP | Facility: HOSPITAL | Age: 56
End: 2021-04-13

## 2021-04-13 ENCOUNTER — HOSPITAL ENCOUNTER (OUTPATIENT)
Facility: HOSPITAL | Age: 56
Setting detail: HOSPITAL OUTPATIENT SURGERY
Discharge: HOME | End: 2021-04-13
Attending: ORTHOPAEDIC SURGERY | Admitting: ORTHOPAEDIC SURGERY

## 2021-04-13 VITALS
OXYGEN SATURATION: 98 % | RESPIRATION RATE: 16 BRPM | HEART RATE: 73 BPM | TEMPERATURE: 97.8 F | SYSTOLIC BLOOD PRESSURE: 109 MMHG | DIASTOLIC BLOOD PRESSURE: 75 MMHG

## 2021-04-13 PROCEDURE — C1713 ANCHOR/SCREW BN/BN,TIS/BN: HCPCS | Performed by: ORTHOPAEDIC SURGERY

## 2021-04-13 PROCEDURE — 25010000002 ONDANSETRON PER 1 MG: Performed by: NURSE ANESTHETIST, CERTIFIED REGISTERED

## 2021-04-13 PROCEDURE — 25010000002 CEFAZOLIN 1-4 GM/50ML-% SOLUTION: Performed by: ORTHOPAEDIC SURGERY

## 2021-04-13 PROCEDURE — 25010000002 PROPOFOL 10 MG/ML EMULSION: Performed by: NURSE ANESTHETIST, CERTIFIED REGISTERED

## 2021-04-13 PROCEDURE — 25010000002 FENTANYL CITRATE (PF) 100 MCG/2ML SOLUTION: Performed by: NURSE ANESTHETIST, CERTIFIED REGISTERED

## 2021-04-13 PROCEDURE — 25010000002 PHENYLEPHRINE PER 1 ML: Performed by: NURSE ANESTHETIST, CERTIFIED REGISTERED

## 2021-04-13 PROCEDURE — 76000 FLUOROSCOPY <1 HR PHYS/QHP: CPT

## 2021-04-13 PROCEDURE — 73140 X-RAY EXAM OF FINGER(S): CPT

## 2021-04-13 PROCEDURE — 25010000003 LIDOCAINE 1 % SOLUTION: Performed by: ORTHOPAEDIC SURGERY

## 2021-04-13 PROCEDURE — 25010000002 MIDAZOLAM PER 1 MG: Performed by: ANESTHESIOLOGY

## 2021-04-13 DEVICE — KWIRE SMOTH DBL/TROC .054X4IN: Type: IMPLANTABLE DEVICE | Site: FINGER LITTLE | Status: FUNCTIONAL

## 2021-04-13 RX ORDER — HYDROCODONE BITARTRATE AND ACETAMINOPHEN 7.5; 325 MG/1; MG/1
1 TABLET ORAL ONCE AS NEEDED
Status: CANCELLED | OUTPATIENT
Start: 2021-04-13

## 2021-04-13 RX ORDER — FENTANYL CITRATE 50 UG/ML
100 INJECTION, SOLUTION INTRAMUSCULAR; INTRAVENOUS
Status: DISCONTINUED | OUTPATIENT
Start: 2021-04-13 | End: 2021-04-13 | Stop reason: HOSPADM

## 2021-04-13 RX ORDER — FENTANYL CITRATE 50 UG/ML
INJECTION, SOLUTION INTRAMUSCULAR; INTRAVENOUS AS NEEDED
Status: DISCONTINUED | OUTPATIENT
Start: 2021-04-13 | End: 2021-04-13 | Stop reason: SURG

## 2021-04-13 RX ORDER — SODIUM CHLORIDE 0.9 % (FLUSH) 0.9 %
3 SYRINGE (ML) INJECTION EVERY 12 HOURS SCHEDULED
Status: DISCONTINUED | OUTPATIENT
Start: 2021-04-13 | End: 2021-04-13 | Stop reason: HOSPADM

## 2021-04-13 RX ORDER — HYDROCODONE BITARTRATE AND ACETAMINOPHEN 10; 325 MG/1; MG/1
1 TABLET ORAL 2 TIMES DAILY
Qty: 30 TABLET | Refills: 0 | Status: SHIPPED | OUTPATIENT
Start: 2021-04-13 | End: 2021-09-01

## 2021-04-13 RX ORDER — SODIUM CHLORIDE 0.9 % (FLUSH) 0.9 %
3-10 SYRINGE (ML) INJECTION AS NEEDED
Status: DISCONTINUED | OUTPATIENT
Start: 2021-04-13 | End: 2021-04-13 | Stop reason: HOSPADM

## 2021-04-13 RX ORDER — OXYCODONE AND ACETAMINOPHEN 7.5; 325 MG/1; MG/1
1 TABLET ORAL ONCE AS NEEDED
Status: CANCELLED | OUTPATIENT
Start: 2021-04-13

## 2021-04-13 RX ORDER — LIDOCAINE HYDROCHLORIDE 20 MG/ML
INJECTION, SOLUTION INFILTRATION; PERINEURAL AS NEEDED
Status: DISCONTINUED | OUTPATIENT
Start: 2021-04-13 | End: 2021-04-13 | Stop reason: SURG

## 2021-04-13 RX ORDER — CEFAZOLIN SODIUM 1 G/50ML
1 INJECTION, SOLUTION INTRAVENOUS ONCE
Status: COMPLETED | OUTPATIENT
Start: 2021-04-13 | End: 2021-04-13

## 2021-04-13 RX ORDER — MIDAZOLAM HYDROCHLORIDE 1 MG/ML
1 INJECTION INTRAMUSCULAR; INTRAVENOUS
Status: DISCONTINUED | OUTPATIENT
Start: 2021-04-13 | End: 2021-04-13 | Stop reason: HOSPADM

## 2021-04-13 RX ORDER — PROPOFOL 10 MG/ML
VIAL (ML) INTRAVENOUS CONTINUOUS PRN
Status: DISCONTINUED | OUTPATIENT
Start: 2021-04-13 | End: 2021-04-13 | Stop reason: SURG

## 2021-04-13 RX ORDER — MAGNESIUM HYDROXIDE 1200 MG/15ML
LIQUID ORAL AS NEEDED
Status: DISCONTINUED | OUTPATIENT
Start: 2021-04-13 | End: 2021-04-13 | Stop reason: HOSPADM

## 2021-04-13 RX ORDER — HYDROMORPHONE HYDROCHLORIDE 1 MG/ML
0.5 INJECTION, SOLUTION INTRAMUSCULAR; INTRAVENOUS; SUBCUTANEOUS
Status: CANCELLED | OUTPATIENT
Start: 2021-04-13

## 2021-04-13 RX ORDER — FLUMAZENIL 0.1 MG/ML
0.2 INJECTION INTRAVENOUS AS NEEDED
Status: CANCELLED | OUTPATIENT
Start: 2021-04-13

## 2021-04-13 RX ORDER — ONDANSETRON 2 MG/ML
4 INJECTION INTRAMUSCULAR; INTRAVENOUS ONCE AS NEEDED
Status: CANCELLED | OUTPATIENT
Start: 2021-04-13

## 2021-04-13 RX ORDER — SULFAMETHOXAZOLE AND TRIMETHOPRIM 800; 160 MG/1; MG/1
1 TABLET ORAL EVERY 12 HOURS SCHEDULED
Status: DISCONTINUED | OUTPATIENT
Start: 2021-04-13 | End: 2021-04-13 | Stop reason: HOSPADM

## 2021-04-13 RX ORDER — LIDOCAINE HYDROCHLORIDE 10 MG/ML
0.5 INJECTION, SOLUTION EPIDURAL; INFILTRATION; INTRACAUDAL; PERINEURAL ONCE AS NEEDED
Status: COMPLETED | OUTPATIENT
Start: 2021-04-13 | End: 2021-04-13

## 2021-04-13 RX ORDER — ONDANSETRON 2 MG/ML
INJECTION INTRAMUSCULAR; INTRAVENOUS AS NEEDED
Status: DISCONTINUED | OUTPATIENT
Start: 2021-04-13 | End: 2021-04-13 | Stop reason: SURG

## 2021-04-13 RX ORDER — SODIUM CHLORIDE, SODIUM LACTATE, POTASSIUM CHLORIDE, CALCIUM CHLORIDE 600; 310; 30; 20 MG/100ML; MG/100ML; MG/100ML; MG/100ML
9 INJECTION, SOLUTION INTRAVENOUS CONTINUOUS
Status: DISCONTINUED | OUTPATIENT
Start: 2021-04-13 | End: 2021-04-13 | Stop reason: HOSPADM

## 2021-04-13 RX ORDER — FENTANYL CITRATE 50 UG/ML
50 INJECTION, SOLUTION INTRAMUSCULAR; INTRAVENOUS
Status: CANCELLED | OUTPATIENT
Start: 2021-04-13

## 2021-04-13 RX ORDER — LIDOCAINE HYDROCHLORIDE 10 MG/ML
INJECTION, SOLUTION INFILTRATION; PERINEURAL AS NEEDED
Status: DISCONTINUED | OUTPATIENT
Start: 2021-04-13 | End: 2021-04-13 | Stop reason: HOSPADM

## 2021-04-13 RX ORDER — EPHEDRINE SULFATE 50 MG/ML
5 INJECTION, SOLUTION INTRAVENOUS ONCE AS NEEDED
Status: CANCELLED | OUTPATIENT
Start: 2021-04-13

## 2021-04-13 RX ORDER — MIDAZOLAM HYDROCHLORIDE 1 MG/ML
2 INJECTION INTRAMUSCULAR; INTRAVENOUS
Status: DISCONTINUED | OUTPATIENT
Start: 2021-04-13 | End: 2021-04-13 | Stop reason: HOSPADM

## 2021-04-13 RX ADMIN — LIDOCAINE HYDROCHLORIDE 60 MG: 20 INJECTION, SOLUTION INFILTRATION; PERINEURAL at 13:30

## 2021-04-13 RX ADMIN — PHENYLEPHRINE HYDROCHLORIDE 100 MCG: 10 INJECTION INTRAVENOUS at 13:40

## 2021-04-13 RX ADMIN — CEFAZOLIN SODIUM 1 G: 1 INJECTION, SOLUTION INTRAVENOUS at 13:30

## 2021-04-13 RX ADMIN — FENTANYL CITRATE 25 MCG: 50 INJECTION INTRAMUSCULAR; INTRAVENOUS at 13:30

## 2021-04-13 RX ADMIN — MIDAZOLAM 2 MG: 1 INJECTION INTRAMUSCULAR; INTRAVENOUS at 13:15

## 2021-04-13 RX ADMIN — ONDANSETRON 4 MG: 2 INJECTION INTRAMUSCULAR; INTRAVENOUS at 13:43

## 2021-04-13 RX ADMIN — PROPOFOL 200 MCG/KG/MIN: 10 INJECTION, EMULSION INTRAVENOUS at 13:30

## 2021-04-13 RX ADMIN — SODIUM CHLORIDE, POTASSIUM CHLORIDE, SODIUM LACTATE AND CALCIUM CHLORIDE 9 ML/HR: 600; 310; 30; 20 INJECTION, SOLUTION INTRAVENOUS at 12:09

## 2021-04-13 RX ADMIN — LIDOCAINE HYDROCHLORIDE 0.5 ML: 10 INJECTION, SOLUTION EPIDURAL; INFILTRATION; INTRACAUDAL; PERINEURAL at 12:09

## 2021-04-13 RX ADMIN — FENTANYL CITRATE 25 MCG: 50 INJECTION INTRAMUSCULAR; INTRAVENOUS at 13:32

## 2021-04-13 NOTE — ANESTHESIA POSTPROCEDURE EVALUATION
Patient: Aracelis Haynes    Procedure Summary     Date: 04/13/21 Room / Location:  [facility]  [facility]    Anesthesia Start: 1321 Anesthesia Stop: 1404    Procedure: LEFT CLOSED REDUCTION AND PERCUTANEOUS PINNING OF PINKY FINGER (Left Finger Little) Diagnosis:     Surgeons: Zain Gilbert MD Provider: Jewel An MD    Anesthesia Type: MAC ASA Status: 2          Anesthesia Type: MAC    Vitals  Vitals Value Taken Time   /76 04/13/21 1430   Temp 36.6 °C (97.8 °F) 04/13/21 1405   Pulse 67 04/13/21 1430   Resp 16 04/13/21 1430   SpO2 99 % 04/13/21 1430           Post Anesthesia Care and Evaluation    Patient location during evaluation: bedside  Patient participation: complete - patient participated  Level of consciousness: awake  Pain management: adequate  Airway patency: patent  Anesthetic complications: No anesthetic complications  PONV Status: controlled  Cardiovascular status: acceptable  Respiratory status: acceptable  Hydration status: acceptable    Comments: --------------------            04/13/21               1430     --------------------   BP:       110/76     Pulse:      67       Resp:       16       Temp:                SpO2:      99%      --------------------

## 2021-04-13 NOTE — ANESTHESIA PREPROCEDURE EVALUATION
Anesthesia Evaluation     Patient summary reviewed and Nursing notes reviewed   NPO Solid Status: > 8 hours             Airway   Mallampati: II  TM distance: >3 FB  Neck ROM: full  no difficulty expected  Dental - normal exam     Pulmonary - normal exam   (+) a smoker Current,   Cardiovascular - negative cardio ROS and normal exam        Neuro/Psych- negative ROS  GI/Hepatic/Renal/Endo - negative ROS     Musculoskeletal (-) negative ROS    Abdominal  - normal exam   Substance History - negative use     OB/GYN negative ob/gyn ROS         Other                        Anesthesia Plan    ASA 2     MAC   (Surgeon will block)  intravenous induction     Anesthetic plan, all risks, benefits, and alternatives have been provided, discussed and informed consent has been obtained with: patient.    Plan discussed with CRNA.

## 2021-05-06 ENCOUNTER — HOSPITAL ENCOUNTER (EMERGENCY)
Facility: HOSPITAL | Age: 56
Discharge: HOME | End: 2021-05-06
Attending: EMERGENCY MEDICINE | Admitting: EMERGENCY MEDICINE

## 2021-05-06 VITALS
WEIGHT: 155 LBS | DIASTOLIC BLOOD PRESSURE: 72 MMHG | HEART RATE: 114 BPM | HEIGHT: 63 IN | SYSTOLIC BLOOD PRESSURE: 107 MMHG | OXYGEN SATURATION: 96 % | BODY MASS INDEX: 27.46 KG/M2 | RESPIRATION RATE: 18 BRPM | TEMPERATURE: 98.9 F

## 2021-05-06 DIAGNOSIS — Z91.89 AT RISK FOR ALLERGIC REACTION TO MEDICATION: Primary | ICD-10-CM

## 2021-05-06 PROCEDURE — 96374 THER/PROPH/DIAG INJ IV PUSH: CPT

## 2021-05-06 PROCEDURE — 99282 EMERGENCY DEPT VISIT SF MDM: CPT | Performed by: EMERGENCY MEDICINE

## 2021-05-06 PROCEDURE — 25010000002 ONDANSETRON PER 1 MG: Performed by: EMERGENCY MEDICINE

## 2021-05-06 PROCEDURE — 99283 EMERGENCY DEPT VISIT LOW MDM: CPT

## 2021-05-06 PROCEDURE — 25010000002 DIPHENHYDRAMINE PER 50 MG: Performed by: EMERGENCY MEDICINE

## 2021-05-06 PROCEDURE — 25010000002 METHYLPREDNISOLONE PER 125 MG: Performed by: EMERGENCY MEDICINE

## 2021-05-06 PROCEDURE — 96375 TX/PRO/DX INJ NEW DRUG ADDON: CPT

## 2021-05-06 RX ORDER — DIPHENHYDRAMINE HYDROCHLORIDE 50 MG/ML
25 INJECTION INTRAMUSCULAR; INTRAVENOUS ONCE
Status: COMPLETED | OUTPATIENT
Start: 2021-05-06 | End: 2021-05-06

## 2021-05-06 RX ORDER — SODIUM CHLORIDE 0.9 % (FLUSH) 0.9 %
10 SYRINGE (ML) INJECTION AS NEEDED
Status: DISCONTINUED | OUTPATIENT
Start: 2021-05-06 | End: 2021-05-07 | Stop reason: HOSPADM

## 2021-05-06 RX ORDER — FAMOTIDINE 40 MG/1
40 TABLET, FILM COATED ORAL DAILY
Qty: 7 TABLET | Refills: 0 | Status: SHIPPED | OUTPATIENT
Start: 2021-05-06 | End: 2021-05-13

## 2021-05-06 RX ORDER — FAMOTIDINE 10 MG/ML
20 INJECTION, SOLUTION INTRAVENOUS ONCE
Status: COMPLETED | OUTPATIENT
Start: 2021-05-06 | End: 2021-05-06

## 2021-05-06 RX ORDER — PREDNISONE 10 MG/1
TABLET ORAL
Qty: 21 TABLET | Refills: 0 | Status: SHIPPED | OUTPATIENT
Start: 2021-05-06 | End: 2021-09-01

## 2021-05-06 RX ORDER — DIPHENHYDRAMINE HCL 25 MG
50 TABLET ORAL EVERY 6 HOURS PRN
Qty: 30 TABLET | Refills: 0 | Status: SHIPPED | OUTPATIENT
Start: 2021-05-06 | End: 2021-09-01

## 2021-05-06 RX ORDER — ONDANSETRON 2 MG/ML
4 INJECTION INTRAMUSCULAR; INTRAVENOUS ONCE
Status: COMPLETED | OUTPATIENT
Start: 2021-05-06 | End: 2021-05-06

## 2021-05-06 RX ORDER — METHYLPREDNISOLONE SODIUM SUCCINATE 125 MG/2ML
125 INJECTION, POWDER, LYOPHILIZED, FOR SOLUTION INTRAMUSCULAR; INTRAVENOUS ONCE
Status: COMPLETED | OUTPATIENT
Start: 2021-05-06 | End: 2021-05-06

## 2021-05-06 RX ADMIN — METHYLPREDNISOLONE SODIUM SUCCINATE 125 MG: 125 INJECTION, POWDER, FOR SOLUTION INTRAMUSCULAR; INTRAVENOUS at 21:08

## 2021-05-06 RX ADMIN — DIPHENHYDRAMINE HYDROCHLORIDE 25 MG: 50 INJECTION, SOLUTION INTRAMUSCULAR; INTRAVENOUS at 21:08

## 2021-05-06 RX ADMIN — FAMOTIDINE 20 MG: 10 INJECTION, SOLUTION INTRAVENOUS at 21:08

## 2021-05-06 RX ADMIN — ONDANSETRON 4 MG: 2 INJECTION INTRAMUSCULAR; INTRAVENOUS at 21:09

## 2021-05-07 ENCOUNTER — EPISODE CHANGES (OUTPATIENT)
Dept: CASE MANAGEMENT | Facility: OTHER | Age: 56
End: 2021-05-07

## 2021-10-13 ENCOUNTER — PATIENT ROUNDING (BHMG ONLY) (OUTPATIENT)
Dept: OBSTETRICS AND GYNECOLOGY | Facility: CLINIC | Age: 56
End: 2021-10-13

## 2021-10-13 ENCOUNTER — OFFICE VISIT (OUTPATIENT)
Dept: OBSTETRICS AND GYNECOLOGY | Facility: CLINIC | Age: 56
End: 2021-10-13

## 2021-10-13 VITALS
BODY MASS INDEX: 28.7 KG/M2 | HEIGHT: 63 IN | WEIGHT: 162 LBS | DIASTOLIC BLOOD PRESSURE: 84 MMHG | SYSTOLIC BLOOD PRESSURE: 124 MMHG

## 2021-10-13 DIAGNOSIS — Z13.9 SCREENING FOR CONDITION: ICD-10-CM

## 2021-10-13 DIAGNOSIS — Z76.89 ESTABLISHING CARE WITH NEW DOCTOR, ENCOUNTER FOR: ICD-10-CM

## 2021-10-13 DIAGNOSIS — R53.83 FATIGUE, UNSPECIFIED TYPE: ICD-10-CM

## 2021-10-13 DIAGNOSIS — Z01.419 ROUTINE GYNECOLOGICAL EXAMINATION: ICD-10-CM

## 2021-10-13 DIAGNOSIS — Z01.419 PAP SMEAR, LOW-RISK: Primary | ICD-10-CM

## 2021-10-13 DIAGNOSIS — R10.32 LLQ PAIN: ICD-10-CM

## 2021-10-13 DIAGNOSIS — Z11.51 SPECIAL SCREENING EXAMINATION FOR HUMAN PAPILLOMAVIRUS (HPV): ICD-10-CM

## 2021-10-13 LAB
BILIRUB BLD-MCNC: NEGATIVE MG/DL
CLARITY, POC: CLEAR
COLOR UR: YELLOW
GLUCOSE UR STRIP-MCNC: NEGATIVE MG/DL
KETONES UR QL: NEGATIVE
LEUKOCYTE EST, POC: NEGATIVE
NITRITE UR-MCNC: NEGATIVE MG/ML
PH UR: 5 [PH] (ref 5–8)
PROT UR STRIP-MCNC: NEGATIVE MG/DL
RBC # UR STRIP: NEGATIVE /UL
SP GR UR: 1 (ref 1–1.03)
UROBILINOGEN UR QL: NORMAL

## 2021-10-13 PROCEDURE — 81002 URINALYSIS NONAUTO W/O SCOPE: CPT | Performed by: OBSTETRICS & GYNECOLOGY

## 2021-10-13 PROCEDURE — 99212 OFFICE O/P EST SF 10 MIN: CPT | Performed by: OBSTETRICS & GYNECOLOGY

## 2021-10-13 PROCEDURE — 99386 PREV VISIT NEW AGE 40-64: CPT | Performed by: OBSTETRICS & GYNECOLOGY

## 2021-10-13 NOTE — PROGRESS NOTES
October 13, 2021    Hello, may I speak with Aracelis Haynes?    My name is JUVENTINO GORDILLO    I am  with XANDER LERMA  [facility]  [address]  [facility] [address] [phone].    Before we get started may I verify your date of birth? 1965    I am calling to officially welcome you to our practice and ask about your recent visit. Is this a good time to talk? YES      Tell me about your visit with us. What things went well?  EVERYTHING WAS GOOD       We're always looking for ways to make our patients' experiences even better. Do you have recommendations on ways we may improve?  NO    Overall were you satisfied with your first visit to our practice? YES       I appreciate you taking the time to speak with me today. Is there anything else I can do for you? NO      Thank you, and have a great day.

## 2021-10-15 LAB
CYTOLOGIST CVX/VAG CYTO: NORMAL
CYTOLOGY CVX/VAG DOC CYTO: NORMAL
CYTOLOGY CVX/VAG DOC THIN PREP: NORMAL
DX ICD CODE: NORMAL
HIV 1 & 2 AB SER-IMP: NORMAL
HPV I/H RISK 4 DNA CVX QL PROBE+SIG AMP: NEGATIVE
OTHER STN SPEC: NORMAL
STAT OF ADQ CVX/VAG CYTO-IMP: NORMAL

## 2021-10-27 ENCOUNTER — HOSPITAL ENCOUNTER (OUTPATIENT)
Dept: CT IMAGING | Facility: HOSPITAL | Age: 56
Discharge: HOME | End: 2021-10-27
Admitting: OBSTETRICS & GYNECOLOGY

## 2021-10-27 DIAGNOSIS — R10.32 LLQ PAIN: ICD-10-CM

## 2021-10-27 PROCEDURE — 0 IOPAMIDOL PER 1 ML: Performed by: OBSTETRICS & GYNECOLOGY

## 2021-10-27 PROCEDURE — 74177 CT ABD & PELVIS W/CONTRAST: CPT

## 2021-10-27 RX ADMIN — IOPAMIDOL 50 ML: 755 INJECTION, SOLUTION INTRAVENOUS at 07:45

## 2021-10-28 NOTE — PROGRESS NOTES
PIP= CT scan of abdomen and pelvis is normal, no cause of LLQ pain is identified. If pain continues, rec further evaluation by her primary MD

## 2021-11-05 ENCOUNTER — TELEPHONE (OUTPATIENT)
Dept: GASTROENTEROLOGY | Facility: CLINIC | Age: 56
End: 2021-11-05

## 2021-11-05 NOTE — TELEPHONE ENCOUNTER
FAST TRACK - REFERRAL - 1ST COLONOSCOPY - SCREENING - FAMILY HISTORY POLYPS, FATHER.  NEEDS PEDS SCOPE.  SHE ASKING FOR EGD BECAUSE OF HER RECURRENT HEARTBURN.  SHE DID ZAINA ABD PAIN, NAUSEA & HEARTBURN.  NEEDS OFFICE APPOINTMENT.

## 2021-11-09 NOTE — TELEPHONE ENCOUNTER
Patient called late yesterday around 5pm.  Scheduled office visit for 04/06/2022.      Colonoscopy scheduled 04/13/2022 at [facility] at 1:15pm - arrive 12pm.  Will give instructions at her office visit.    COVID test on 04/11/2022, will call with time.  Need to self quarantine until after procedure.  She understands.

## 2022-01-12 ENCOUNTER — HOSPITAL ENCOUNTER (OUTPATIENT)
Dept: MAMMOGRAPHY | Facility: HOSPITAL | Age: 57
Discharge: HOME | End: 2022-01-12

## 2022-01-12 ENCOUNTER — APPOINTMENT (OUTPATIENT)
Dept: BONE DENSITY | Facility: HOSPITAL | Age: 57
End: 2022-01-12

## 2022-01-12 DIAGNOSIS — Z13.9 SCREENING FOR CONDITION: ICD-10-CM

## 2022-01-12 PROCEDURE — 77063 BREAST TOMOSYNTHESIS BI: CPT

## 2022-01-12 PROCEDURE — 77067 SCR MAMMO BI INCL CAD: CPT

## 2022-01-12 PROCEDURE — 77080 DXA BONE DENSITY AXIAL: CPT

## 2022-01-12 NOTE — PROGRESS NOTES
PIP= DEXA shows osteopenia with a low risk of fracture. Rec daily calcium and vit D intake and weight bearing exercises. Repeat DEAX in 2-3 years

## 2022-03-30 ENCOUNTER — OFFICE VISIT (OUTPATIENT)
Dept: ORTHOPEDIC SURGERY | Facility: CLINIC | Age: 57
End: 2022-03-30

## 2022-03-30 VITALS — HEIGHT: 63 IN | BODY MASS INDEX: 28.37 KG/M2 | WEIGHT: 160.1 LBS | TEMPERATURE: 98 F

## 2022-03-30 DIAGNOSIS — M25.552 LEFT HIP PAIN: ICD-10-CM

## 2022-03-30 DIAGNOSIS — M25.562 LEFT KNEE PAIN, UNSPECIFIED CHRONICITY: Primary | ICD-10-CM

## 2022-03-30 PROCEDURE — 20610 DRAIN/INJ JOINT/BURSA W/O US: CPT | Performed by: ORTHOPAEDIC SURGERY

## 2022-03-30 PROCEDURE — 73562 X-RAY EXAM OF KNEE 3: CPT | Performed by: ORTHOPAEDIC SURGERY

## 2022-03-30 PROCEDURE — 73502 X-RAY EXAM HIP UNI 2-3 VIEWS: CPT | Performed by: ORTHOPAEDIC SURGERY

## 2022-03-30 PROCEDURE — 99203 OFFICE O/P NEW LOW 30 MIN: CPT | Performed by: ORTHOPAEDIC SURGERY

## 2022-03-30 RX ORDER — IBUPROFEN 200 MG
200 TABLET ORAL EVERY 6 HOURS PRN
COMMUNITY

## 2022-03-30 RX ADMIN — METHYLPREDNISOLONE ACETATE 80 MG: 80 INJECTION, SUSPENSION INTRA-ARTICULAR; INTRALESIONAL; INTRAMUSCULAR; SOFT TISSUE at 13:19

## 2022-03-30 NOTE — PROGRESS NOTES
Patient: Aracelis Haynes    YOB: 1965    Medical Record Number: [id number]    Chief Complaints: Left hip and knee pain    History of Present Illness:     56 y.o. female patient who presents for her left hip and knee.  It started bothering her a few weeks ago.  She cannot recall any injury.  It began with the hip.  She localizes the pain to the lateral aspect, just over the iliac crest.  It is worse with certain movements but the pain is constant.  She thinks that because of altered gait mechanics the knee began to bother her.  Her knee pain is posterior lateral.  She is not having any mechanical symptoms.  Denies any radicular symptoms including shooting pain down the leg, weakness, numbness, paresthesias, bowel or bladder dysfunction.    Allergies:   Allergies   Allergen Reactions   • Penicillins Hives   • Pineapple Nausea Only and Swelling     Swelling and peeling inside the mouth   • Nickel    • Bactrim [Sulfamethoxazole-Trimethoprim] Hives and Rash       Home Medications:      Current Outpatient Medications:   •  Chlorcyclizine-Pseudoephed (Stahist AD) 25-60 MG tablet, Take 1 tablet by mouth Every 8 (Eight) Hours As Needed (congestion. may substitute benadryl at night)., Disp: 30 tablet, Rfl: 0  •  ibuprofen (ADVIL,MOTRIN) 200 MG tablet, Take 200 mg by mouth Every 6 (Six) Hours As Needed for Mild Pain ., Disp: , Rfl:   •  omeprazole (priLOSEC) 20 MG capsule, Take 20 mg by mouth Daily. Prn., Disp: , Rfl:     Past Medical History:   Diagnosis Date   • Arthritis    • Endometriosis    • GERD (gastroesophageal reflux disease)        Past Surgical History:   Procedure Laterality Date   • AUGMENTATION MAMMAPLASTY Bilateral     silicone   •  SECTION      x 2   •  SECTION     • CHOLECYSTECTOMY     • D & C WITH SUCTION     • FINGER/THUMB CLOSED REDUCTION AND PERCUTANEOUS PINNING Left 2021    Procedure: LEFT CLOSED REDUCTION AND PERCUTANEOUS PINNING OF PINKY FINGER;  Surgeon:  Zain Gilbert MD;  [place] [facility]  Service: Hand;  Laterality: Left;   • FULGURATION ENDOMETRIOSIS     • HYSTERECTOMY      TLH/LSO   • TONSILLECTOMY     • TUBAL ABDOMINAL LIGATION     • WISDOM TOOTH EXTRACTION         Social History     Occupational History   • Not on file   Tobacco Use   • Smoking status: Current Every Day Smoker     Packs/day: 0.50     Types: Cigarettes   • Smokeless tobacco: Never Used   Vaping Use   • Vaping Use: Never used   Substance and Sexual Activity   • Alcohol use: Yes     Comment: socially   • Drug use: Defer   • Sexual activity: Not Currently     Partners: Male     Birth control/protection: Surgical     Comment: TLH LSO      Social History     Social History Narrative   • Not on file       Family History   Problem Relation Age of Onset   • Heart disease Mother    • Hypertension Mother    • Depression Mother    • Heart disease Father    • Hypertension Father    • Kidney nephrosis Father    • Diabetes Paternal Grandmother    • Breast cancer Paternal Grandmother    • Ovarian cancer Neg Hx    • Uterine cancer Neg Hx    • Colon cancer Neg Hx    • Pulmonary embolism Neg Hx    • Deep vein thrombosis Neg Hx        Review of Systems:      Constitutional: Denies fever, shaking or chills   Eyes: Denies change in visual acuity   HEENT: Denies nasal congestion or sore throat   Respiratory: Denies cough or shortness of breath   Cardiovascular: Denies chest pain or edema  Endocrine: Denies tremors, palpitations, intolerance of heat or cold, polyuria, polydipsia.  GI: Denies abdominal pain, nausea, vomiting, bloody stools or diarrhea  : Denies frequency, urgency, incontinence, retention, or nocturia.  Musculoskeletal: Denies numbness, tingling or loss of motor function except as above  Integument: Denies rash, lesion or ulceration   Neurologic: Denies headache or focal weakness, deficits  Heme: Denies spontaneous or excessive bleeding, epistaxis, hematuria, melena, fatigue,  "enlarged or tender lymph nodes.      All other pertinent positives and negatives as noted above in HPI.    Physical Exam: 56 y.o. female    Vitals:    03/30/22 1256   Temp: 98 °F (36.7 °C)   Weight: 72.6 kg (160 lb 1.6 oz)   Height: 160 cm (63\")       General:  Patient is awake and alert.  Appears in no acute distress or discomfort.    Psych:  Affect and demeanor are appropriate.    Eyes:  Conjunctiva and sclera appear grossly normal.  Eyes track well and EOM seem to be intact.    Ears:  No gross abnormalities.  Hearing adequate for the exam.    Cardiovascular:  Regular rate and rhythm.    Lungs:  Good chest expansion.  Breathing unlabored.    Lymph:  No palpable masses or adenopathy in the left lower extremity    Extremities: Her left lower extremity is examined.  Skin is benign.  No atrophy, swelling or masses.  She has focal tenderness just over the iliac crest on the left side of her hemipelvis.  There is no palpable defect in the musculature or fascia.  No mass.  This area is fairly exquisitely tender.  When she leans over to the right, it does seem to aggravate the pain.  If she abducts her hip, it also seems to aggravate her pain.  She does not have any groin pain.  Full hip motion.  Good strength with hip flexion.  At the knee, again, her skin is benign.  No knee effusion.  Mild tenderness along the posterior and lateral joint line, just above the fibular head and a little anterior.  Lateral Jairon's does reproduce some pain in this area but no mechanical symptoms.  She has full knee motion.  No instability.  Normal motor and sensory function in her lower leg and foot.  Negative straight leg raise test.         Radiology:   AP and lateral views left hip as well as AP, merchant and lateral views left knee are ordered and reviewed to evaluate her complaints.  No comparison films are available.  I do not see any concerning findings on the x-rays.    Assessment/Plan: 1.  Left hip abductor origin strain 2.  " Left knee pain, unclear etiology    I suggested that an injection for her hip may help.  The risk, benefits and alternatives were discussed.  She consented and an injection into the point of maximal tenderness at her abductor origin was performed.  I reexamined her about 10 minutes after the injection and she reported tremendous improvement in her overall pain.  Her motion and function were also tremendously improved.  She could abduct without any significant discomfort.  We will see how things go from here.  If her knee is bothering her because of altered gait mechanics then I expect this should get better if the hip pain resolves.  If either these issues persist, we may have to consider further work-up.  I will see her over the [facility] and so I will check on her over the coming weeks.  She is in agreement with the plan.    Javier Prabhakar MD    03/30/2022    Large Joint Arthrocentesis  Date/Time: 3/30/2022 1:19 PM  Consent given by: patient  Site marked: site marked  Timeout: Immediately prior to procedure a time out was called to verify the correct patient, procedure, equipment, support staff and site/side marked as required   Supporting Documentation  Indications: pain   Procedure Details  Location: hip - Hip joint: LT HIP IT BAND.  Preparation: Patient was prepped and draped in the usual sterile fashion  Needle size: 25 G  Approach: posterior  Medications administered: 2 mL lidocaine (cardiac); 80 mg methylPREDNISolone acetate 80 MG/ML

## 2022-03-31 RX ORDER — METHYLPREDNISOLONE ACETATE 80 MG/ML
80 INJECTION, SUSPENSION INTRA-ARTICULAR; INTRALESIONAL; INTRAMUSCULAR; SOFT TISSUE
Status: COMPLETED | OUTPATIENT
Start: 2022-03-30 | End: 2022-03-30

## 2022-04-06 ENCOUNTER — OFFICE VISIT (OUTPATIENT)
Dept: GASTROENTEROLOGY | Facility: CLINIC | Age: 57
End: 2022-04-06

## 2022-04-06 VITALS
BODY MASS INDEX: 29.23 KG/M2 | HEIGHT: 63 IN | DIASTOLIC BLOOD PRESSURE: 70 MMHG | WEIGHT: 165 LBS | SYSTOLIC BLOOD PRESSURE: 110 MMHG

## 2022-04-06 DIAGNOSIS — R10.32 LEFT LOWER QUADRANT ABDOMINAL PAIN: ICD-10-CM

## 2022-04-06 DIAGNOSIS — Z83.71 FAMILY HISTORY OF POLYPS IN THE COLON: ICD-10-CM

## 2022-04-06 DIAGNOSIS — K21.9 GASTROESOPHAGEAL REFLUX DISEASE, UNSPECIFIED WHETHER ESOPHAGITIS PRESENT: Primary | ICD-10-CM

## 2022-04-06 PROCEDURE — 99204 OFFICE O/P NEW MOD 45 MIN: CPT | Performed by: INTERNAL MEDICINE

## 2022-04-06 RX ORDER — OMEPRAZOLE 40 MG/1
40 CAPSULE, DELAYED RELEASE ORAL DAILY
Qty: 90 CAPSULE | Refills: 3 | Status: SHIPPED | OUTPATIENT
Start: 2022-04-06

## 2022-04-06 NOTE — PROGRESS NOTES
PATIENT INFORMATION  Aracelis Haynes       - 1965    CHIEF COMPLAINT  Chief Complaint   Patient presents with   • Heartburn       HISTORY OF PRESENT ILLNESS  So long standing GERD on BID OTC PPI and has a family history of polyps from both parents but no previous colonosocpy ( one cancelled durng the Pandemic)    NO dysphagia nor weith loss    Does have extensive adhesions from previous surgeries  So would need a Peds Scope      REVIEWED PERTINENT RESULTS/ LABS  No results found for: CASEREPORT, FINALDX  Lab Results   Component Value Date    HGB 14.4 10/15/2019    MCV 93.7 10/15/2019     10/15/2019    ALT 13 2021    AST 16 2021      XR Knee 3 View Left    Result Date: 3/30/2022  Impression: Ordering physician's impression is located in the Encounter Note dated 22. X-ray performed in the DR room.     XR Hip With or Without Pelvis 2 - 3 View Left    Result Date: 3/30/2022  Impression: Ordering physician's impression is located in the Encounter Note dated 22. X-ray performed in the DR room.       REVIEW OF SYSTEMS  Review of Systems   Constitutional: Negative for activity change, chills, fever and unexpected weight change.   HENT: Negative for congestion.    Eyes: Negative for visual disturbance.   Respiratory: Negative for shortness of breath.    Cardiovascular: Negative for chest pain and palpitations.   Gastrointestinal: Positive for abdominal distention, abdominal pain and diarrhea. Negative for blood in stool.   Endocrine: Negative for cold intolerance and heat intolerance.   Genitourinary: Negative for hematuria.   Musculoskeletal: Negative for gait problem.   Skin: Negative for color change.   Allergic/Immunologic: Negative for immunocompromised state.   Neurological: Negative for weakness and light-headedness.   Hematological: Negative for adenopathy.   Psychiatric/Behavioral: Negative for sleep disturbance. The patient is not nervous/anxious.          ACTIVE  PROBLEMS  Patient Active Problem List    Diagnosis    • GERD (gastroesophageal reflux disease) [K21.9]    • Menopause [Z78.0]    • Arthritis [M19.90]    • Osteopenia [M85.80]    • Vitamin D deficiency [E55.9]    • GERD (gastroesophageal reflux disease) [K21.9]    • Oropharyngeal dysphagia [R13.12]    • Family history of polyps in the colon [Z83.71]          PAST MEDICAL HISTORY  Past Medical History:   Diagnosis Date   • Arthritis    • Endometriosis    • GERD (gastroesophageal reflux disease)          SURGICAL HISTORY  Past Surgical History:   Procedure Laterality Date   • AUGMENTATION MAMMAPLASTY Bilateral     silicone   •  SECTION      x 2   •  SECTION     • CHOLECYSTECTOMY     • D & C WITH SUCTION     • FINGER/THUMB CLOSED REDUCTION AND PERCUTANEOUS PINNING Left 2021    Procedure: LEFT CLOSED REDUCTION AND PERCUTANEOUS PINNING OF PINKY FINGER;  Surgeon: Zain Gilbert MD;  Location: [facility] OR [facility];  Service: Hand;  Laterality: Left;   • FULGURATION ENDOMETRIOSIS     • HYSTERECTOMY      TLH/LSO   • TONSILLECTOMY     • TUBAL ABDOMINAL LIGATION     • WISDOM TOOTH EXTRACTION           FAMILY HISTORY  Family History   Problem Relation Age of Onset   • Heart disease Mother    • Hypertension Mother    • Depression Mother    • Heart disease Father    • Hypertension Father    • Kidney nephrosis Father    • Diabetes Paternal Grandmother    • Breast cancer Paternal Grandmother    • Ovarian cancer Neg Hx    • Uterine cancer Neg Hx    • Colon cancer Neg Hx    • Pulmonary embolism Neg Hx    • Deep vein thrombosis Neg Hx          SOCIAL HISTORY  Social History     Occupational History   • Not on file   Tobacco Use   • Smoking status: Current Every Day Smoker     Packs/day: 0.50     Types: Cigarettes   • Smokeless tobacco: Never Used   Vaping Use   • Vaping Use: Never used   Substance and Sexual Activity   • Alcohol use: Yes     Comment: socially   • Drug use: Defer   • Sexual activity: Not  "Currently     Partners: Male     Birth control/protection: Surgical     Comment: [facility]         CURRENT MEDICATIONS    Current Outpatient Medications:   •  Chlorcyclizine-Pseudoephed (Stahist AD) 25-60 MG tablet, Take 1 tablet by mouth Every 8 (Eight) Hours As Needed (congestion. may substitute benadryl at night)., Disp: 30 tablet, Rfl: 0  •  ibuprofen (ADVIL,MOTRIN) 200 MG tablet, Take 200 mg by mouth Every 6 (Six) Hours As Needed for Mild Pain ., Disp: , Rfl:   •  omeprazole (priLOSEC) 20 MG capsule, Take 40 mg by mouth As Needed. Prn., Disp: , Rfl:     ALLERGIES  Penicillins, Pineapple, Nickel, and Bactrim [sulfamethoxazole-trimethoprim]    VITALS  Vitals:    04/06/22 0950   BP: 110/70   BP Location: Left arm   Patient Position: Sitting   Cuff Size: Large Adult   Weight: 74.8 kg (165 lb)   Height: 160 cm (63\")       PHYSICAL EXAM  Debilities/Disabilities Identified: None  Emotional Behavior: Appropriate  Wt Readings from Last 3 Encounters:   04/06/22 74.8 kg (165 lb)   03/30/22 72.6 kg (160 lb 1.6 oz)   10/13/21 73.5 kg (162 lb)     Ht Readings from Last 1 Encounters:   04/06/22 160 cm (63\")     Body mass index is 29.23 kg/m².  Physical Exam  Constitutional:       Appearance: She is well-developed. She is not diaphoretic.   HENT:      Head: Normocephalic and atraumatic.   Eyes:      General: No scleral icterus.     Conjunctiva/sclera: Conjunctivae normal.      Pupils: Pupils are equal, round, and reactive to light.   Neck:      Thyroid: No thyromegaly.   Cardiovascular:      Rate and Rhythm: Normal rate and regular rhythm.      Heart sounds: Normal heart sounds. No murmur heard.    No gallop.   Pulmonary:      Effort: Pulmonary effort is normal.      Breath sounds: Normal breath sounds. No wheezing or rales.   Abdominal:      General: Bowel sounds are normal. There is no distension or abdominal bruit.      Palpations: Abdomen is soft. There is no shifting dullness, fluid wave or mass.      Tenderness: There is " abdominal tenderness in the right lower quadrant, epigastric area and left lower quadrant. There is no guarding. Negative signs include Macedo's sign.      Hernia: There is no hernia in the ventral area.   Musculoskeletal:         General: Normal range of motion.      Cervical back: Normal range of motion and neck supple.   Lymphadenopathy:      Cervical: No cervical adenopathy.   Skin:     General: Skin is warm and dry.      Findings: No erythema or rash.   Neurological:      Mental Status: She is alert and oriented to person, place, and time.   Psychiatric:         Mood and Affect: Mood normal.         Behavior: Behavior normal.         CLINICAL DATA REVIEWED   reviewed previous lab results and integrated with today's visit, reviewed notes from other physicians and/or last GI encounter, reviewed previous endoscopy results and available photos, reviewed surgical pathology results from previous biopsies    ASSESSMENT  Diagnoses and all orders for this visit:    Gastroesophageal reflux disease, unspecified whether esophagitis present  -     Case Request; Standing  -     COVID PRE-OP / PRE-PROCEDURE SCREENING ORDER (NO ISOLATION) - Swab, Nasopharynx; Future  -     Case Request    Left lower quadrant abdominal pain    Family history of polyps in the colon  -     Case Request; Standing  -     COVID PRE-OP / PRE-PROCEDURE SCREENING ORDER (NO ISOLATION) - Swab, Nasopharynx; Future  -     Case Request    Other orders  -     Follow Anesthesia Guidelines / Protocol; Future          PLAN  Return if symptoms worsen or fail to improve.    I have discussed the above plan with the patient.  They verbalize understanding and are in agreement with the plan.  They have been advised to contact the office for any questions, concerns, or changes related to their health.

## 2022-04-06 NOTE — H&P (VIEW-ONLY)
PATIENT INFORMATION  Aracelis Haynes       - 1965    CHIEF COMPLAINT  Chief Complaint   Patient presents with   • Heartburn       HISTORY OF PRESENT ILLNESS  So long standing GERD on BID OTC PPI and has a family history of polyps from both parents but no previous colonosocpy ( one cancelled durng the Pandemic)    NO dysphagia nor weith loss    Does have extensive adhesions from previous surgeries  So would need a Peds Scope      REVIEWED PERTINENT RESULTS/ LABS  No results found for: CASEREPORT, FINALDX  Lab Results   Component Value Date    HGB 14.4 10/15/2019    MCV 93.7 10/15/2019     10/15/2019    ALT 13 2021    AST 16 2021      XR Knee 3 View Left    Result Date: 3/30/2022  Impression: Ordering physician's impression is located in the Encounter Note dated 22. X-ray performed in the DR room.     XR Hip With or Without Pelvis 2 - 3 View Left    Result Date: 3/30/2022  Impression: Ordering physician's impression is located in the Encounter Note dated 22. X-ray performed in the DR room.       REVIEW OF SYSTEMS  Review of Systems   Constitutional: Negative for activity change, chills, fever and unexpected weight change.   HENT: Negative for congestion.    Eyes: Negative for visual disturbance.   Respiratory: Negative for shortness of breath.    Cardiovascular: Negative for chest pain and palpitations.   Gastrointestinal: Positive for abdominal distention, abdominal pain and diarrhea. Negative for blood in stool.   Endocrine: Negative for cold intolerance and heat intolerance.   Genitourinary: Negative for hematuria.   Musculoskeletal: Negative for gait problem.   Skin: Negative for color change.   Allergic/Immunologic: Negative for immunocompromised state.   Neurological: Negative for weakness and light-headedness.   Hematological: Negative for adenopathy.   Psychiatric/Behavioral: Negative for sleep disturbance. The patient is not nervous/anxious.          ACTIVE  PROBLEMS  Patient Active Problem List    Diagnosis    • GERD (gastroesophageal reflux disease) [K21.9]    • Menopause [Z78.0]    • Arthritis [M19.90]    • Osteopenia [M85.80]    • Vitamin D deficiency [E55.9]    • GERD (gastroesophageal reflux disease) [K21.9]    • Oropharyngeal dysphagia [R13.12]    • Family history of polyps in the colon [Z83.71]          PAST MEDICAL HISTORY  Past Medical History:   Diagnosis Date   • Arthritis    • Endometriosis    • GERD (gastroesophageal reflux disease)          SURGICAL HISTORY  Past Surgical History:   Procedure Laterality Date   • AUGMENTATION MAMMAPLASTY Bilateral     silicone   •  SECTION      x 2   •  SECTION     • CHOLECYSTECTOMY     • D & C WITH SUCTION     • FINGER/THUMB CLOSED REDUCTION AND PERCUTANEOUS PINNING Left 2021    Procedure: LEFT CLOSED REDUCTION AND PERCUTANEOUS PINNING OF PINKY FINGER;  Surgeon: Zain Gilbert MD;  [facility]: [facility];  Service: Hand;  Laterality: Left;   • FULGURATION ENDOMETRIOSIS     • HYSTERECTOMY      TLH/LSO   • TONSILLECTOMY     • TUBAL ABDOMINAL LIGATION     • WISDOM TOOTH EXTRACTION           FAMILY HISTORY  Family History   Problem Relation Age of Onset   • Heart disease Mother    • Hypertension Mother    • Depression Mother    • Heart disease Father    • Hypertension Father    • Kidney nephrosis Father    • Diabetes Paternal Grandmother    • Breast cancer Paternal Grandmother    • Ovarian cancer Neg Hx    • Uterine cancer Neg Hx    • Colon cancer Neg Hx    • Pulmonary embolism Neg Hx    • Deep vein thrombosis Neg Hx          SOCIAL HISTORY  Social History     Occupational History   • Not on file   Tobacco Use   • Smoking status: Current Every Day Smoker     Packs/day: 0.50     Types: Cigarettes   • Smokeless tobacco: Never Used   Vaping Use   • Vaping Use: Never used   Substance and Sexual Activity   • Alcohol use: Yes     Comment: socially   • Drug use: Defer   • Sexual activity: Not  "Currently     Partners: Male     Birth control/protection: Surgical     Comment: [facility]         CURRENT MEDICATIONS    Current Outpatient Medications:   •  Chlorcyclizine-Pseudoephed (Stahist AD) 25-60 MG tablet, Take 1 tablet by mouth Every 8 (Eight) Hours As Needed (congestion. may substitute benadryl at night)., Disp: 30 tablet, Rfl: 0  •  ibuprofen (ADVIL,MOTRIN) 200 MG tablet, Take 200 mg by mouth Every 6 (Six) Hours As Needed for Mild Pain ., Disp: , Rfl:   •  omeprazole (priLOSEC) 20 MG capsule, Take 40 mg by mouth As Needed. Prn., Disp: , Rfl:     ALLERGIES  Penicillins, Pineapple, Nickel, and Bactrim [sulfamethoxazole-trimethoprim]    VITALS  Vitals:    04/06/22 0950   BP: 110/70   BP Location: Left arm   Patient Position: Sitting   Cuff Size: Large Adult   Weight: 74.8 kg (165 lb)   Height: 160 cm (63\")       PHYSICAL EXAM  Debilities/Disabilities Identified: None  Emotional Behavior: Appropriate  Wt Readings from Last 3 Encounters:   04/06/22 74.8 kg (165 lb)   03/30/22 72.6 kg (160 lb 1.6 oz)   10/13/21 73.5 kg (162 lb)     Ht Readings from Last 1 Encounters:   04/06/22 160 cm (63\")     Body mass index is 29.23 kg/m².  Physical Exam  Constitutional:       Appearance: She is well-developed. She is not diaphoretic.   HENT:      Head: Normocephalic and atraumatic.   Eyes:      General: No scleral icterus.     Conjunctiva/sclera: Conjunctivae normal.      Pupils: Pupils are equal, round, and reactive to light.   Neck:      Thyroid: No thyromegaly.   Cardiovascular:      Rate and Rhythm: Normal rate and regular rhythm.      Heart sounds: Normal heart sounds. No murmur heard.    No gallop.   Pulmonary:      Effort: Pulmonary effort is normal.      Breath sounds: Normal breath sounds. No wheezing or rales.   Abdominal:      General: Bowel sounds are normal. There is no distension or abdominal bruit.      Palpations: Abdomen is soft. There is no shifting dullness, fluid wave or mass.      Tenderness: There is " abdominal tenderness in the right lower quadrant, epigastric area and left lower quadrant. There is no guarding. Negative signs include Macedo's sign.      Hernia: There is no hernia in the ventral area.   Musculoskeletal:         General: Normal range of motion.      Cervical back: Normal range of motion and neck supple.   Lymphadenopathy:      Cervical: No cervical adenopathy.   Skin:     General: Skin is warm and dry.      Findings: No erythema or rash.   Neurological:      Mental Status: She is alert and oriented to person, place, and time.   Psychiatric:         Mood and Affect: Mood normal.         Behavior: Behavior normal.         CLINICAL DATA REVIEWED   reviewed previous lab results and integrated with today's visit, reviewed notes from other physicians and/or last GI encounter, reviewed previous endoscopy results and available photos, reviewed surgical pathology results from previous biopsies    ASSESSMENT  Diagnoses and all orders for this visit:    Gastroesophageal reflux disease, unspecified whether esophagitis present  -     Case Request; Standing  -     COVID PRE-OP / PRE-PROCEDURE SCREENING ORDER (NO ISOLATION) - Swab, Nasopharynx; Future  -     Case Request    Left lower quadrant abdominal pain    Family history of polyps in the colon  -     Case Request; Standing  -     COVID PRE-OP / PRE-PROCEDURE SCREENING ORDER (NO ISOLATION) - Swab, Nasopharynx; Future  -     Case Request    Other orders  -     Follow Anesthesia Guidelines / Protocol; Future          PLAN  Return if symptoms worsen or fail to improve.    I have discussed the above plan with the patient.  They verbalize understanding and are in agreement with the plan.  They have been advised to contact the office for any questions, concerns, or changes related to their health.

## 2022-04-08 ENCOUNTER — PATIENT ROUNDING (BHMG ONLY) (OUTPATIENT)
Dept: GASTROENTEROLOGY | Facility: CLINIC | Age: 57
End: 2022-04-08

## 2022-04-08 NOTE — PROGRESS NOTES
April 8, 2022    Hello, may I speak with Aracelis Haynes?    My name is Maria Guadalupe Lara      I am  with [facility]  [address]  [place] [address] [phone].    Before we get started may I verify your date of birth? 1965    I am calling to officially welcome you to our practice and ask about your recent visit. Is this a good time to talk? no    *LVM for pt to call the office back if she has any questions or needs anything until she is seen again.

## 2022-04-11 ENCOUNTER — LAB (OUTPATIENT)
Dept: LAB | Facility: HOSPITAL | Age: 57
End: 2022-04-11

## 2022-04-11 DIAGNOSIS — Z83.71 FAMILY HISTORY OF POLYPS IN THE COLON: ICD-10-CM

## 2022-04-11 DIAGNOSIS — K21.9 GASTROESOPHAGEAL REFLUX DISEASE, UNSPECIFIED WHETHER ESOPHAGITIS PRESENT: ICD-10-CM

## 2022-04-11 LAB — SARS-COV-2 ORF1AB RESP QL NAA+PROBE: NOT DETECTED

## 2022-04-11 PROCEDURE — C9803 HOPD COVID-19 SPEC COLLECT: HCPCS

## 2022-04-11 PROCEDURE — U0004 COV-19 TEST NON-CDC HGH THRU: HCPCS

## 2022-04-12 ENCOUNTER — ANESTHESIA EVENT (OUTPATIENT)
Dept: PERIOP | Facility: HOSPITAL | Age: 57
End: 2022-04-12

## 2022-04-13 ENCOUNTER — HOSPITAL ENCOUNTER (OUTPATIENT)
Facility: HOSPITAL | Age: 57
Setting detail: HOSPITAL OUTPATIENT SURGERY
Discharge: HOME | End: 2022-04-13
Attending: INTERNAL MEDICINE | Admitting: INTERNAL MEDICINE

## 2022-04-13 ENCOUNTER — ANESTHESIA (OUTPATIENT)
Dept: PERIOP | Facility: HOSPITAL | Age: 57
End: 2022-04-13

## 2022-04-13 VITALS
SYSTOLIC BLOOD PRESSURE: 96 MMHG | TEMPERATURE: 97.8 F | RESPIRATION RATE: 17 BRPM | OXYGEN SATURATION: 95 % | DIASTOLIC BLOOD PRESSURE: 59 MMHG | HEART RATE: 71 BPM

## 2022-04-13 DIAGNOSIS — K21.9 GASTROESOPHAGEAL REFLUX DISEASE, UNSPECIFIED WHETHER ESOPHAGITIS PRESENT: ICD-10-CM

## 2022-04-13 DIAGNOSIS — Z83.71 FAMILY HISTORY OF POLYPS IN THE COLON: ICD-10-CM

## 2022-04-13 PROCEDURE — 45380 COLONOSCOPY AND BIOPSY: CPT | Performed by: INTERNAL MEDICINE

## 2022-04-13 PROCEDURE — 45385 COLONOSCOPY W/LESION REMOVAL: CPT | Performed by: INTERNAL MEDICINE

## 2022-04-13 PROCEDURE — 25010000002 PROPOFOL 10 MG/ML EMULSION

## 2022-04-13 PROCEDURE — 43239 EGD BIOPSY SINGLE/MULTIPLE: CPT | Performed by: INTERNAL MEDICINE

## 2022-04-13 PROCEDURE — 88305 TISSUE EXAM BY PATHOLOGIST: CPT | Performed by: INTERNAL MEDICINE

## 2022-04-13 RX ORDER — LIDOCAINE HYDROCHLORIDE 10 MG/ML
0.5 INJECTION, SOLUTION EPIDURAL; INFILTRATION; INTRACAUDAL; PERINEURAL ONCE AS NEEDED
Status: COMPLETED | OUTPATIENT
Start: 2022-04-13 | End: 2022-04-13

## 2022-04-13 RX ORDER — SODIUM CHLORIDE 0.9 % (FLUSH) 0.9 %
10 SYRINGE (ML) INJECTION EVERY 12 HOURS SCHEDULED
Status: DISCONTINUED | OUTPATIENT
Start: 2022-04-13 | End: 2022-04-13 | Stop reason: HOSPADM

## 2022-04-13 RX ORDER — OMEPRAZOLE 40 MG/1
40 CAPSULE, DELAYED RELEASE ORAL
Qty: 180 CAPSULE | Refills: 3 | Status: SHIPPED | OUTPATIENT
Start: 2022-04-13

## 2022-04-13 RX ORDER — PROPOFOL 10 MG/ML
VIAL (ML) INTRAVENOUS AS NEEDED
Status: DISCONTINUED | OUTPATIENT
Start: 2022-04-13 | End: 2022-04-13 | Stop reason: SURG

## 2022-04-13 RX ORDER — SODIUM CHLORIDE, SODIUM LACTATE, POTASSIUM CHLORIDE, CALCIUM CHLORIDE 600; 310; 30; 20 MG/100ML; MG/100ML; MG/100ML; MG/100ML
9 INJECTION, SOLUTION INTRAVENOUS CONTINUOUS
Status: DISCONTINUED | OUTPATIENT
Start: 2022-04-13 | End: 2022-04-13 | Stop reason: HOSPADM

## 2022-04-13 RX ORDER — SODIUM CHLORIDE 9 MG/ML
40 INJECTION, SOLUTION INTRAVENOUS AS NEEDED
Status: DISCONTINUED | OUTPATIENT
Start: 2022-04-13 | End: 2022-04-13 | Stop reason: HOSPADM

## 2022-04-13 RX ORDER — SODIUM CHLORIDE 0.9 % (FLUSH) 0.9 %
10 SYRINGE (ML) INJECTION AS NEEDED
Status: DISCONTINUED | OUTPATIENT
Start: 2022-04-13 | End: 2022-04-13 | Stop reason: HOSPADM

## 2022-04-13 RX ADMIN — PROPOFOL 25 MG: 10 INJECTION, EMULSION INTRAVENOUS at 13:30

## 2022-04-13 RX ADMIN — LIDOCAINE HYDROCHLORIDE 50 MG: 10 INJECTION, SOLUTION EPIDURAL; INFILTRATION; INTRACAUDAL; PERINEURAL at 13:06

## 2022-04-13 RX ADMIN — PROPOFOL 50 MG: 10 INJECTION, EMULSION INTRAVENOUS at 13:21

## 2022-04-13 RX ADMIN — PROPOFOL 50 MG: 10 INJECTION, EMULSION INTRAVENOUS at 13:14

## 2022-04-13 RX ADMIN — PROPOFOL 25 MG: 10 INJECTION, EMULSION INTRAVENOUS at 13:40

## 2022-04-13 RX ADMIN — PROPOFOL 25 MG: 10 INJECTION, EMULSION INTRAVENOUS at 13:35

## 2022-04-13 RX ADMIN — PROPOFOL 50 MG: 10 INJECTION, EMULSION INTRAVENOUS at 13:42

## 2022-04-13 RX ADMIN — PROPOFOL 50 MG: 10 INJECTION, EMULSION INTRAVENOUS at 13:12

## 2022-04-13 RX ADMIN — LIDOCAINE HYDROCHLORIDE 50 MG: 10 INJECTION, SOLUTION EPIDURAL; INFILTRATION; INTRACAUDAL; PERINEURAL at 13:12

## 2022-04-13 RX ADMIN — SODIUM CHLORIDE, POTASSIUM CHLORIDE, SODIUM LACTATE AND CALCIUM CHLORIDE 9 ML/HR: 600; 310; 30; 20 INJECTION, SOLUTION INTRAVENOUS at 12:58

## 2022-04-13 RX ADMIN — PROPOFOL 50 MG: 10 INJECTION, EMULSION INTRAVENOUS at 13:09

## 2022-04-13 RX ADMIN — PROPOFOL 100 MG: 10 INJECTION, EMULSION INTRAVENOUS at 13:17

## 2022-04-13 RX ADMIN — PROPOFOL 50 MG: 10 INJECTION, EMULSION INTRAVENOUS at 13:25

## 2022-04-13 RX ADMIN — PROPOFOL 25 MG: 10 INJECTION, EMULSION INTRAVENOUS at 13:38

## 2022-04-13 RX ADMIN — PROPOFOL 25 MG: 10 INJECTION, EMULSION INTRAVENOUS at 13:33

## 2022-04-13 RX ADMIN — PROPOFOL 25 MG: 10 INJECTION, EMULSION INTRAVENOUS at 13:28

## 2022-04-13 RX ADMIN — PROPOFOL 50 MG: 10 INJECTION, EMULSION INTRAVENOUS at 13:06

## 2022-04-13 NOTE — INTERVAL H&P NOTE
Vital signs  BP 99/52 (BP Location: Left arm, Patient Position: Lying)   Pulse 75   Temp 98.2 °F (36.8 °C) (Oral)   Resp 20   SpO2 95%     H&P reviewed. The patient was examined and there are no changes to the H&P.

## 2022-04-13 NOTE — ANESTHESIA PREPROCEDURE EVALUATION
Anesthesia Evaluation     Patient summary reviewed and Nursing notes reviewed   history of anesthetic complications: PONV  NPO Solid Status: > 8 hours  NPO Liquid Status: > 8 hours           Airway   Mallampati: II  TM distance: >3 FB  Neck ROM: full  No difficulty expected  Dental - normal exam     Pulmonary - normal exam   (+) a smoker ( 1ppd x 30 years.) Current Abstained day of surgery,   Cardiovascular - negative cardio ROS and normal exam  Exercise tolerance: good (4-7 METS)    ECG reviewed  Rhythm: regular  Rate: normal        Neuro/Psych- negative ROS  GI/Hepatic/Renal/Endo    (+)  GERD well controlled,  thyroid problem thyroid nodules    Musculoskeletal     Abdominal  - normal exam   Substance History - negative use     OB/GYN negative ob/gyn ROS         Other   arthritis,      ROS/Med Hx Other: Narrative & Impression      HEART RATE= 77  bpm  RR Interval= 776  ms  IN Interval= 129  ms  P Horizontal Axis= 2  deg  P Front Axis= 73  deg  QRSD Interval= 85  ms  QT Interval= 377  ms  QRS Axis= 76  deg  T Wave Axis= 54  deg  - NORMAL ECG -  Sinus rhythm  NO SIGNIFICANT CHANGE FROM PREVIOUS ECG  Electronically Signed By: Shahid Burk ([facility]) 12-Apr-2021 17:07:42  Date and Time of Study: 2021-04-12 15:13:25    Specimen Collected: 04/12/21 15:13                          Anesthesia Plan    ASA 2     MAC     intravenous induction     Anesthetic plan, all risks, benefits, and alternatives have been provided, discussed and informed consent has been obtained with: patient.  Use of blood products discussed with patient  Consented to blood products.   Plan discussed with CRNA.

## 2022-04-13 NOTE — ANESTHESIA POSTPROCEDURE EVALUATION
Patient: Aracelis Haynes    Procedure Summary     Date: 04/13/22 Room / Location: [facility] ENDOSCOPY 1 /  [facility] OR    Anesthesia Start: 1302 Anesthesia Stop: 1353    Procedures:       ESOPHAGOGASTRODUODENOSCOPY (N/A Esophagus)      COLONOSCOPY (N/A ) Diagnosis:       Gastroesophageal reflux disease, unspecified whether esophagitis present      Family history of polyps in the colon      Erosive esophagitis      Gastritis      Colon polyp      (Gastroesophageal reflux disease, unspecified whether esophagitis present [K21.9])      (Family history of polyps in the colon [Z83.71])    Surgeons: Yeison Rivera MD Provider: Fred Washington CRNA    Anesthesia Type: MAC ASA Status: 2          Anesthesia Type: MAC    Vitals  Vitals Value Taken Time   /76 04/13/22 1352   Temp 97.8 °F (36.6 °C) 04/13/22 1352   Pulse 70 04/13/22 1352   Resp 16 04/13/22 1352   SpO2 95 % 04/13/22 1352           Post Anesthesia Care and Evaluation    Patient location during evaluation: PHASE II  Patient participation: complete - patient participated  Level of consciousness: awake  Pain score: 0  Pain management: adequate  Airway patency: patent  Anesthetic complications: No anesthetic complications  PONV Status: none  Cardiovascular status: acceptable  Respiratory status: acceptable  Hydration status: acceptable

## 2022-04-13 NOTE — BRIEF OP NOTE
ESOPHAGOGASTRODUODENOSCOPY, COLONOSCOPY  Progress Note    Aracelis Haynes  4/13/2022    Pre-op Diagnosis:   Gastroesophageal reflux disease, unspecified whether esophagitis present [K21.9]  Family history of polyps in the colon [Z83.71]       Post-Op Diagnosis Codes:     * Gastroesophageal reflux disease, unspecified whether esophagitis present [K21.9]     * Family history of polyps in the colon [Z83.71]     * Erosive esophagitis [K22.10]     * Gastritis [K29.70]     * Colon polyp [K63.5]    Procedure/CPT® Codes:        Procedure(s):  ESOPHAGOGASTRODUODENOSCOPY  COLONOSCOPY    Surgeon(s):  Yeison Rivera MD    Anesthesia: Monitored Anesthesia Care    Staff:   Circulator: Ronda Anders RN  Scrub Person: Radha Garrett         Estimated Blood Loss: none    Urine Voided: * No values recorded between 4/13/2022  1:03 PM and 4/13/2022  1:51 PM *    Specimens:                Specimens     ID Source Type Tests Collected By Collected At Frozen?    A Gastric, Body Tissue · TISSUE PATHOLOGY EXAM   Yeison Rivera MD 4/13/22 1312     Description: GASTRIC BIOPSY    B Esophagus, Distal Tissue · TISSUE PATHOLOGY EXAM   Yeison Rivera MD 4/13/22 1315     Description: DISTAL ESOPHAGUS BIOPSY    C Large Intestine, Transverse Colon Polyp · TISSUE PATHOLOGY EXAM   Yeison Rivera MD 4/13/22 1326     Description: TRANSVERSE COLON POLYP    D Large Intestine, Sigmoid Colon Polyp · TISSUE PATHOLOGY EXAM   Yeison Rivera MD 4/13/22 1335     Description: SIGMOID COLON POLYP-COLD POLYP    E Large Intestine, Rectum Polyp · TISSUE PATHOLOGY EXAM   Yeison Rivera MD 4/13/22 1346     Description: RECTAL POLYPS X2                Drains: * No LDAs found *    Findings: Normal Duodenum  Mild gastritis-Biopsy  Erosive Esophagitis-Biopsy    Colon to TI Good prep  Polyps-4-Cold Snare x 1, Biopsy    Complications: None          Yeison Rivera MD     Date:  4/13/2022  Time: 13:55 EDT

## 2022-04-15 LAB
LAB AP CASE REPORT: NORMAL
PATH REPORT.FINAL DX SPEC: NORMAL
PATH REPORT.GROSS SPEC: NORMAL

## 2022-04-20 ENCOUNTER — TELEPHONE (OUTPATIENT)
Dept: GASTROENTEROLOGY | Facility: CLINIC | Age: 57
End: 2022-04-20

## 2022-04-20 NOTE — TELEPHONE ENCOUNTER
sched 8/17/22 @ 9:45    ----- Message from Mildred Nguyen RN sent at 4/18/2022  8:28 AM EDT -----  Regarding: Routine f/u EGD  Needs routine 3 month EGD follow up

## 2022-05-03 ENCOUNTER — TRANSCRIBE ORDERS (OUTPATIENT)
Dept: OCCUPATIONAL THERAPY | Facility: CLINIC | Age: 57
End: 2022-05-03

## 2022-05-03 DIAGNOSIS — S39.012D ACUTE MYOFASCIAL STRAIN OF LUMBOSACRAL REGION, SUBSEQUENT ENCOUNTER: Primary | ICD-10-CM

## 2022-05-03 NOTE — PROGRESS NOTES
Physical Therapy Initial Evaluation and Plan of Care    Patient Name: Aracelis Haynes          :  1965  Referring Physician: Amarjit Arroyo MD  Diagnosis: Acute myofascial strain of lumbosacral region, subsequent encounter [S39.012D]  Date of Evaluation: 2022  ______________________________________________________________________    Subjective Evaluation    History of Present Illness  Date of onset: 2022  Mechanism of injury: Gyn sx - prep belly, etc - no belt to hold Pt on table - Pt wiggled - Pt started to come off table, but legs still in stirrups - Pt had to run over  Grabbed between her legs to grab her torso to hold her on the bed and came around to the side and holding the right side of the bed -had to hold her as she was moving a lot - coming out of anesthesia too soon - Had to hold the Pt for some time as they had to call for assistance multiple times before help arrived -   Pain progressively worsening -       Patient Occupation: [employer]  Current pain rating: 3  At worst pain ratin  Location: LB and (R) - no radiation - no LE Symptoms -   Alleviating factors: Changing positions - Nothing really helps   Exacerbated by: Rising; Lifting (R) leg to put shoe on; Bending over; In/out of car; Coughing / sneezing; Walking somewhat;  Standing;  Difficulty getting comfortable in bed; Turning inbed;   Progression: worsening    Social Support  Patient lives at: Home w/ stairs;     Diagnostic Tests  Abnormal x-ray: Flexed sacrum?    Treatments  Current treatment: medication  Patient Goals  Patient/family treatment goals: Pain alleviation; Mobility to allow ADLs and normal job          ___________________________________________________  Objective          Postural Observations    Additional Postural Observation Details  (R) Ilium / PSIS / ASIS;  Hyperlordosis; Flexed sacrum -         Tenderness     Additional Tenderness Details  Tender (R) SI Jt region and L4-5  central -     Active Range of Motion     Additional Active Range of Motion Details  Limited reversal lumbar lordosis w/ flexion; 1/2 normal;SB (L) 1/3 norm and ext WFL all w/ pain (R) LB/SI region       Strength/Myotome Testing     Additional Strength Details  (B) LE Myotomes WNL grossly     Tests     Additional Tests Details  (+) SI Jt Dysfunction / Upshear (R)   (-) SLR; (+) Flexed Sacurm        See Treatment Flow sheet for Exercises, Manual therapy, and modalities.   FUNCTIONAL ACTIVITIES:   · TAPING / BRACING: NA  · Anatomy / Dysfunction education -   · Jt protection, ADL modification; Posture and     ___________________________________________________  Assessment & Plan     Assessment    Assessment details: 57 yo female - Lumbar strain; SI Jt dysfunction    PROBLEMS: Pain; Limited mobility; Intolerance to ADLs and normal job -   PROGNOSIS: Good    GOALS:   SHORT TERM GOALS: 2 weeks:  1) HEP Initiated; 2) Pain decreased 50%:   3) AROM lumbar spine flexion increased w/ improved mobility of reversal of lumbar spin:  4) Improved functional ability grossly;     LONG TERM GOALS: 4 weeks (or at time of DISCHARGE): 1) (I) HEP; 2) AROM WFL and pain free; 3) Strength / mobility to be able to perform all ADL's and job-related activities w/o restrictions;       Plan  Planned therapy interventions: abdominal trunk stabilization, body mechanics training, flexibility, home exercise program, manual therapy, postural training, spinal/joint mobilization, strengthening, stretching and therapeutic activities (Modalities prn; Taping  /bracing prn; )  Frequency: 3x week  Duration in weeks: 4  Treatment plan discussed with: patient      ___________________________________________________  Manual Therapy:    08     mins  28416;  Therapeutic Exercise:    10     mins  24572;     Neuromuscular Ping:        mins  49688;    Therapeutic Activity:     08     mins  65837;   Self Care:                           mins   17869  Ultrasound:     08     mins  44914;  Iontophoresis:          mins  57672;    Electrical Stimulation:    15     mins  38119 ( );  Mechanical Traction:          mins  21792  Dry Needling          mins self-pay    Eval:   20   mins    Timed Treatment:   34   mins                  Total Treatment:     75  mins    PT SIGNATURE:   Steve Cee, PT  DATE TREATMENT INITIATED: 5/04/2022  ___________________________________________________  Initial Certification  Certification Period: 8/1/2022  I certify that the therapy services are furnished while this patient is under my care.  The services outlined above are required by this patient, and will be reviewed every 90 days.     PHYSICIAN: ________________________________  DATE: ______  Amarjit Arroyo MD        Please sign and return via fax to [phone].. Thank you, [facility].  ______________________________________________________________________  [address] [place] [address]  Phone: [phone] Fax: [phone]

## 2022-05-04 ENCOUNTER — TREATMENT (OUTPATIENT)
Dept: PHYSICAL THERAPY | Facility: CLINIC | Age: 57
End: 2022-05-04

## 2022-05-04 DIAGNOSIS — S39.012D ACUTE MYOFASCIAL STRAIN OF LUMBOSACRAL REGION, SUBSEQUENT ENCOUNTER: Primary | ICD-10-CM

## 2022-05-04 PROCEDURE — 97162 PT EVAL MOD COMPLEX 30 MIN: CPT | Performed by: PHYSICAL THERAPIST

## 2022-05-04 PROCEDURE — 97110 THERAPEUTIC EXERCISES: CPT | Performed by: PHYSICAL THERAPIST

## 2022-05-04 PROCEDURE — 97140 MANUAL THERAPY 1/> REGIONS: CPT | Performed by: PHYSICAL THERAPIST

## 2022-05-04 PROCEDURE — 97530 THERAPEUTIC ACTIVITIES: CPT | Performed by: PHYSICAL THERAPIST

## 2022-05-05 ENCOUNTER — TREATMENT (OUTPATIENT)
Dept: PHYSICAL THERAPY | Facility: CLINIC | Age: 57
End: 2022-05-05

## 2022-05-05 DIAGNOSIS — M53.3 SACROILIAC JOINT DYSFUNCTION: ICD-10-CM

## 2022-05-05 DIAGNOSIS — S39.012D ACUTE MYOFASCIAL STRAIN OF LUMBOSACRAL REGION, SUBSEQUENT ENCOUNTER: Primary | ICD-10-CM

## 2022-05-05 PROCEDURE — 97033 APP MDLTY 1+IONTPHRSIS EA 15: CPT | Performed by: PHYSICAL THERAPIST

## 2022-05-05 PROCEDURE — 97140 MANUAL THERAPY 1/> REGIONS: CPT | Performed by: PHYSICAL THERAPIST

## 2022-05-05 PROCEDURE — 97110 THERAPEUTIC EXERCISES: CPT | Performed by: PHYSICAL THERAPIST

## 2022-05-05 PROCEDURE — 97530 THERAPEUTIC ACTIVITIES: CPT | Performed by: PHYSICAL THERAPIST

## 2022-05-05 NOTE — PROGRESS NOTES
Physical Therapy Daily Progress Note    Patient Name: Aracelis Haynes         :  1965  Referring Physician: Amarjit Arroyo MD  Treatment Date: 2022  Date of Initial Visit: No linked episodes    Visit Diagnoses:    ICD-10-CM ICD-9-CM   1. Acute myofascial strain of lumbosacral region, subsequent encounter  S39.012D V58.89     846.0       _____________________________________________________    Subjective   Aracelis Haynes reports: felt better this AM - took a hot shower this AM - everything was cool until she tried to lift her (R) leg up to put her leggings on and she felt a pop in the (R) side of her back and increased pain - She was feeling better up til then - Severe pain (R) LB/SI region -    Objective   (R) ilium / ASIS / PSIS higher vs (L)  Painful lumbar flexion near end range - SB to (L) painful and limited -   Tender (R) SI Jt and QL w/ TPs -     See Exercise, Manual, and Modality Logs for complete treatment.     Functional / Therapeutic Activities:    · TAPING / BRACING: NA  · SEE EXERCISE FLOW SHEET -   · Assessment of LB/SI   · Jt protection, ADL modification; Posture and      Assessment/Plan  55 yo female - Lumbar strain; SI Jt dysfunction  Was improved until getting dressed -   Improved posturing / alignment and ROM after MT today -   Pt would benefit from using her TENS unit regularly -     Progress strengthening /stabilization /functional activity       _________________________________________________    Manual Therapy:            08     mins  29441;  Therapeutic Exercise:   15    mins  51771;     Neuromuscular Ping:        mins  27006;    Therapeutic Activity:      08      mins  81713;     Ultrasound:                    08      mins  99196;  Iontophoresis:               08      mins  21829;    Electrical Stimulation:     15    mins  24592 ( );  Mechanical Traction:          mins  15115  Dry Needling                       mins self-pay     Timed Treatment:   47     mins                  Total Treatment:    70    mins        Steve Cee, PT  Physical Therapist  Lic # [id number]

## 2022-05-06 ENCOUNTER — TREATMENT (OUTPATIENT)
Dept: PHYSICAL THERAPY | Facility: CLINIC | Age: 57
End: 2022-05-06

## 2022-05-06 DIAGNOSIS — M53.3 SACROILIAC JOINT DYSFUNCTION: ICD-10-CM

## 2022-05-06 DIAGNOSIS — S39.012D ACUTE MYOFASCIAL STRAIN OF LUMBOSACRAL REGION, SUBSEQUENT ENCOUNTER: Primary | ICD-10-CM

## 2022-05-06 PROCEDURE — 97110 THERAPEUTIC EXERCISES: CPT | Performed by: PHYSICAL THERAPIST

## 2022-05-06 PROCEDURE — 97140 MANUAL THERAPY 1/> REGIONS: CPT | Performed by: PHYSICAL THERAPIST

## 2022-05-06 PROCEDURE — 97014 ELECTRIC STIMULATION THERAPY: CPT | Performed by: PHYSICAL THERAPIST

## 2022-05-06 NOTE — PROGRESS NOTES
Physical Therapy Daily Progress Note    Patient Name: Aracelis Haynes         :  1965  Referring Physician: Amarjit Arroyo MD  Treatment Date: 2022  Date of Initial Visit: Type: THERAPY  Noted: 2022    Visit Diagnoses:    ICD-10-CM ICD-9-CM   1. Acute myofascial strain of lumbosacral region, subsequent encounter  S39.012D V58.89     846.0   2. Sacroiliac joint dysfunction  M53.3 724.6       _____________________________________________________    Subjective   Aracelis Haynes reports: feeling better after last session - made sure to get dressed laying down and had no problems - Decreased pain and improved mobility - No sharp pains - no feelings of spasms -     Objective   Pt presents w/ more level pelvis - tender at (R) SI region - Improved AROM all planes w/ discomfort w/ SB-ing R/L - improved reversal of lumbar lordosis -   (-) SI Jt dysfunction -     See Exercise, Manual, and Modality Logs for complete treatment.     Functional / Therapeutic Activities:    · TAPING / BRACING: NA  · Jt protection, ADL modification; Posture and      Assessment/Plan  57 yo female - Lumbar strain; SI Jt dysfunction (R) -   Improving w/ decreased pain and improved mobility and function after SI mobilizations and ADL modifications -        Continue per plan - add Quadraped [place] U/LE ext (Bird Dogs),  Standing rows and UE Ext w/ tubing for stabilization -      _________________________________________________     Manual Therapy:            08     mins  60877;  Therapeutic Exercise:   23    mins  97417;     Neuromuscular Ping:        mins  67419;    Therapeutic Activity:            mins  41746;     Ultrasound:                    08      mins  77800;  Iontophoresis:                     mins  70863;    Electrical Stimulation:     15    mins  76813 ( );  Mechanical Traction:          mins  75593  Dry Needling                       mins self-pay     Timed Treatment:   39    mins                  Total  Treatment:    65    mins        Steve Cee, PT  Physical Therapist  Lic # [id number]

## 2022-05-09 ENCOUNTER — TREATMENT (OUTPATIENT)
Dept: PHYSICAL THERAPY | Facility: CLINIC | Age: 57
End: 2022-05-09

## 2022-05-09 DIAGNOSIS — M53.3 SACROILIAC JOINT DYSFUNCTION: ICD-10-CM

## 2022-05-09 DIAGNOSIS — S39.012D ACUTE MYOFASCIAL STRAIN OF LUMBOSACRAL REGION, SUBSEQUENT ENCOUNTER: Primary | ICD-10-CM

## 2022-05-09 PROCEDURE — 97110 THERAPEUTIC EXERCISES: CPT | Performed by: PHYSICAL THERAPIST

## 2022-05-09 NOTE — PROGRESS NOTES
Physical Therapy Daily Progress Note    Visit Diagnoses:    ICD-10-CM ICD-9-CM   1. Acute myofascial strain of lumbosacral region, subsequent encounter  S39.012D V58.89     846.0   2. Sacroiliac joint dysfunction  M53.3 724.6       VISIT#: 4      Aracelis Haynes reports: Her back is feeling awesome and feels pretty back much to normal. She was able to do a lot of her normal activities this weekend without any issues. [place] and because of [place] rain, she did not do anything heavy this weekend so she is not sure if her back is completely back to normal. Her sleep is good too. She will see the MD tomorrow  Current Pain Level:    0/10; Worst:   0/10; Best:  0/10  Location Of Pain: none   Quality of Pain: none  Response to Previous Session: good. No issues  Functional Deficits/Irritating Factors: Rising; Lifting (R) leg to put shoe on; Bending over; In/out of car; Coughing / sneezing; Walking somewhat;  Standing;  Difficulty getting comfortable in bed; Turning inbed;   Progression: Improving  Compliance with HEP Reported: Yes    Objective  Presents: Normal ambulation  Increased sets/reps of:  none   Increased resistance on:  none  Added to Program: Bird dogs, Standing Rows, B UE extension       See Exercise, Manual, and Modality Logs for complete treatment.     Patient Education: Pt was educated on exercise biomechanical correctness, intensity, and speed.     Assessment:  Pt doing very well and progressing towards her goals. Pt may be ready to return to work after next PT session. Pt will continue to benefit from skilled PT interventions to address current functional deficits and impairments.       Plan: Progress to/Continue with current program. Return to MD        Manual Therapy:    0     mins  82716;  Ultrasound:   0 mins 96559  Electrical Stimulation: __0____ mins 56606/  Iontophoresis: 0 mins 30314  Traction: 0 mins  28717  Work Conditionin mins 43291  Therapeutic Exercise:    55     mins   20154;     Neuromuscular Ping:    0    mins  54153;    Therapeutic Activity:     0     mins  56820;     Timed Treatment:   55   mins   Total Treatment:     55   mins    Mary Goff, KRISTINA  [place] License # [id number]  Physical Therapist Assistant

## 2022-05-10 ENCOUNTER — TREATMENT (OUTPATIENT)
Dept: PHYSICAL THERAPY | Facility: CLINIC | Age: 57
End: 2022-05-10

## 2022-05-10 DIAGNOSIS — M53.3 SACROILIAC JOINT DYSFUNCTION: ICD-10-CM

## 2022-05-10 DIAGNOSIS — S39.012D ACUTE MYOFASCIAL STRAIN OF LUMBOSACRAL REGION, SUBSEQUENT ENCOUNTER: Primary | ICD-10-CM

## 2022-05-10 PROCEDURE — 97530 THERAPEUTIC ACTIVITIES: CPT | Performed by: PHYSICAL THERAPIST

## 2022-05-10 PROCEDURE — 97110 THERAPEUTIC EXERCISES: CPT | Performed by: PHYSICAL THERAPIST

## 2022-05-10 NOTE — PROGRESS NOTES
------------------------------------------------------------------------------------------------------   MD PROGRESS NOTE    Patient: Aracelis Haynes        : 1965  Diagnosis/ICD-10 Code:  Acute myofascial strain of lumbosacral region, subsequent encounter [S39.012D]  Referring practitioner: Amarjit Arroyo MD  Date of Initial Visit: 2022                  Today's Date: 5/10/2022  _________________________________________________________________    Thank you for the referral of Ms. Haynes to [facility].  Ms. Haynes has attended 5 PT sessions and their treatment has consisted of: modalities prn, manual therapy, therapeutic exercise, patient education, and HEP.     Subjective   Aracelis Haynes reports: 90% improvement - hasn't woke her up in bed, no meds, able to get dressed regularly - Pain in (R) LB mild and intermittent - worked regular shift today - noted some discomfort, but stretched and this alleviated her pain -   ___________________________________________________________________  Objective              OBSERVATION: much improved gait / transitional movements - Level pelvis -               PALPATION: Residual tenderness (R) SI region and L        AROM: Full AROM w/ some (R) LB/SI soreness at end-range SB -    STRENGTH: LE Myotomes WNL (B)    DTR's/SENSATION: Grossly intact (B) LEs -    SPECIAL TESTS: (-) SLR: (-) SI Jt dysfunction (R)   ACTIVITY TOLERANCE: Improved tolerance to ADLs - limited w/ lifting/ pushing/pulling activities required for her normal job -      See Exercise, Manual, and Modality Logs for complete treatment.      Functional / Therapeutic Activities:    · TAPING / BRACING: NA  · SEE EXERCISE FLOW SHEET -   · FUNCTIONAL ASSESSMENT -    · Jt protection, ADL modification; Posture and    ___________________________________________________________________   Assessment/Plan  55 yo female - Lumbar strain; SI Jt dysfunction (R) -   Much improved  w/ decreased pain and improved mobility and function after SI mobilizations and ADL modifications -     Ms. Haynes may benefit from one more week of Physical therapy vs trial RTW regular duty -     P: Recommend continued Physical Therapy to allow a full and safe return to ADL's and normal job duties vs trial of RTW w/o restrictions.   Please advise after your exam.    Thank you again for this referral of Ms. Haynes to [facility].    PT Signature: ______________________________   Steve Cee, PT  ____________________________________________________________________  Manual Therapy:         mins  22931;  Therapeutic Exercise:    25     mins  97850;     Neuromuscular Ping:        mins  05852;    Therapeutic Activity:     23     mins  89731;   Self Care:                           mins  55092  Ultrasound:          mins  09620;  Iontophoresis:          mins  74299;    Electrical Stimulation:    15     mins  25407 ( );    Timed Treatment:   48   mins                  Total Treatment:     70   mins    ______________________________________________________________________  [address] [place] [address]  Phone: [phone] Fax: [phone]

## 2023-02-01 ENCOUNTER — TELEPHONE (OUTPATIENT)
Dept: OBSTETRICS AND GYNECOLOGY | Facility: CLINIC | Age: 58
End: 2023-02-01

## 2023-02-01 NOTE — TELEPHONE ENCOUNTER
Caller: Aracelis Haynes    Relationship: Self    Best call back number: [phone]    What orders are you requesting (i.e. lab or imaging): MAMMOGRAM    In what timeframe would the patient need to come in: SAME DAY ANNUAL IF POSSIBLE    Where will you receive your lab/imaging services: [facility]    Additional notes: PLEASE PLACE ORDER FOR MAMMO AND TRY TO GET PT EVON FOR SAME DAY AS ANNUAL.

## 2023-02-02 DIAGNOSIS — Z12.31 ENCOUNTER FOR SCREENING MAMMOGRAM FOR MALIGNANT NEOPLASM OF BREAST: Primary | ICD-10-CM

## 2023-02-22 ENCOUNTER — HOSPITAL ENCOUNTER (OUTPATIENT)
Dept: MAMMOGRAPHY | Facility: HOSPITAL | Age: 58
Discharge: HOME | End: 2023-02-22
Admitting: OBSTETRICS & GYNECOLOGY
Payer: COMMERCIAL

## 2023-02-22 DIAGNOSIS — Z12.31 ENCOUNTER FOR SCREENING MAMMOGRAM FOR MALIGNANT NEOPLASM OF BREAST: ICD-10-CM

## 2023-02-22 PROCEDURE — 77063 BREAST TOMOSYNTHESIS BI: CPT

## 2023-02-22 PROCEDURE — 77067 SCR MAMMO BI INCL CAD: CPT

## 2023-05-24 RX ORDER — OMEPRAZOLE 40 MG/1
40 CAPSULE, DELAYED RELEASE ORAL DAILY
Qty: 90 CAPSULE | Refills: 3 | OUTPATIENT
Start: 2023-05-24

## 2024-05-17 ENCOUNTER — OFFICE VISIT (OUTPATIENT)
Dept: OBSTETRICS AND GYNECOLOGY | Facility: CLINIC | Age: 59
End: 2024-05-17
Payer: COMMERCIAL

## 2024-05-17 VITALS
HEIGHT: 65 IN | WEIGHT: 151.6 LBS | DIASTOLIC BLOOD PRESSURE: 66 MMHG | BODY MASS INDEX: 25.26 KG/M2 | SYSTOLIC BLOOD PRESSURE: 112 MMHG

## 2024-05-17 DIAGNOSIS — Z13.820 SCREENING FOR OSTEOPOROSIS: ICD-10-CM

## 2024-05-17 DIAGNOSIS — Z11.51 SCREENING FOR HUMAN PAPILLOMAVIRUS (HPV): ICD-10-CM

## 2024-05-17 DIAGNOSIS — Z01.419 ROUTINE GYNECOLOGICAL EXAMINATION: Primary | ICD-10-CM

## 2024-05-17 DIAGNOSIS — N95.2 VAGINAL ATROPHY: ICD-10-CM

## 2024-05-17 DIAGNOSIS — Z12.31 ENCOUNTER FOR SCREENING MAMMOGRAM FOR MALIGNANT NEOPLASM OF BREAST: ICD-10-CM

## 2024-05-17 DIAGNOSIS — N94.2 VAGINISMUS: ICD-10-CM

## 2024-05-17 RX ORDER — ESTRADIOL 0.1 MG/G
1 CREAM VAGINAL 2 TIMES WEEKLY
Qty: 45 G | Refills: 6 | Status: SHIPPED | OUTPATIENT
Start: 2024-05-20

## 2024-05-17 NOTE — PROGRESS NOTES
GYN Annual Exam     CC- Here for annual exam.     Aracelis Haynes is a 58 y.o. female est pt who presents for annual well woman exam and to discuss multiple issues. She was last seen in 10/2021. She had a TLH LSO at age 40 for pain and endometriosis.. She has pain with sex as well as vaginal dryness. Her last child is about to leave home.     OB History          3    Para   2    Term   2            AB   1    Living   2         SAB   1    IAB        Ectopic        Molar        Multiple        Live Births              Obstetric Comments   2 C/S- 10 #  1 SAB with D&C molar               Menarche:18  Menopause:not yet  HRT:none  Current contraception:  TLH with LSO age 40 endometriosis  History of abnormal Pap smear: no   History of abnormal mammogram: no  Family history of uterine, colon or ovarian cancer: no  Family history of breast cancer: yes - PGM > 50  STD's: none  Last pap smear: 10/2021- normal pap/HPV  Gardasil: missed  CANDY: none      Health Maintenance   Topic Date Due    Pneumococcal Vaccine 0-64 (1 of 2 - PCV) Never done    TDAP/TD VACCINES (1 - Tdap) Never done    HEPATITIS C SCREENING  Never done    ANNUAL PHYSICAL  Never done    ZOSTER VACCINE (1 of 2) Never done    Annual Gynecologic Pelvic and Breast Exam  10/14/2022    COVID-19 Vaccine (3 - -24 season) 2023    DXA SCAN  2024    INFLUENZA VACCINE  2024    MAMMOGRAM  2025    BMI FOLLOWUP  2025    PAP SMEAR  2027    COLORECTAL CANCER SCREENING  2032       Past Medical History:   Diagnosis Date    Arthritis     Endometriosis     GERD (gastroesophageal reflux disease)        Past Surgical History:   Procedure Laterality Date    AUGMENTATION MAMMAPLASTY Bilateral     silicone     SECTION      x 2     SECTION      CHOLECYSTECTOMY      COLONOSCOPY W/ POLYPECTOMY N/A 2022    Procedure: COLONOSCOPY WITH POLYPECTOMY;  Surgeon: Yeison Rivera MD;  Location: [facility]  OR;  Service: Gastroenterology;  Laterality: N/A;  TRANSVERSE COLON POLYP  SIGMOID COLON POLYP- COLD SNARE  SIGMOID COLON POLYP-BIOPSY FORCEPS  RECTAL POLYPS X2  DIVERTCULOSIS    D & C WITH SUCTION      ENDOSCOPY N/A 4/13/2022    Procedure: ESOPHAGOGASTRODUODENOSCOPY WITH BIOPSY;  Surgeon: Yeison Rivera MD;  Location:  [facility] OR;  Service: Gastroenterology;  Laterality: N/A;  EROSIVE ESOPHAGITIS  MODERATE GASTRITIS  GASTRIC BIOPSY  DISTAL ESOPHAGUS BIOPSY    FINGER/THUMB CLOSED REDUCTION AND PERCUTANEOUS PINNING Left 4/13/2021    Procedure: LEFT CLOSED REDUCTION AND PERCUTANEOUS PINNING OF PINKY FINGER;  Surgeon: Zain Gilbert MD;  Location:  [facility];  Service: Hand;  Laterality: Left;    FULGURATION ENDOMETRIOSIS      HYSTERECTOMY      TLH/LSO    TONSILLECTOMY      TUBAL ABDOMINAL LIGATION      WISDOM TOOTH EXTRACTION           Current Outpatient Medications:     Chlorcyclizine-Pseudoephed (Stahist AD) 25-60 MG tablet, Take 1 tablet by mouth Every 8 (Eight) Hours As Needed (congestion. may substitute benadryl at night)., Disp: 30 tablet, Rfl: 0    clobetasol prop emollient base (TEMOVATE) 0.05 % emollient cream, APPLY TO RASH ONCE DAILY as directed by prescriber., Disp: 60 g, Rfl: 1    ibuprofen (ADVIL,MOTRIN) 200 MG tablet, Take 1 tablet by mouth Every 6 (Six) Hours As Needed for Mild Pain., Disp: , Rfl:     omeprazole (priLOSEC) 20 MG capsule, Take 2 capsules by mouth As Needed. Prn., Disp: , Rfl:     omeprazole (priLOSEC) 40 MG capsule, Take 1 capsule by mouth Daily., Disp: 90 capsule, Rfl: 3    omeprazole (priLOSEC) 40 MG capsule, Take 1 capsule by mouth 2 (Two) Times a Day Before Meals., Disp: 180 capsule, Rfl: 3    estradiol (ESTRACE) 0.1 MG/GM vaginal cream, Insert 1 g into the vagina 2 (Two) Times a Week., Disp: 45 g, Rfl: 6    Allergies   Allergen Reactions    Penicillins Hives    Pineapple Nausea Only and Swelling     Swelling and peeling inside the mouth    Nickel Unknown -  "High Severity    Bactrim [Sulfamethoxazole-Trimethoprim] Hives and Rash       Social History     Tobacco Use    Smoking status: Every Day     Current packs/day: 0.50     Types: Cigarettes    Smokeless tobacco: Never   Vaping Use    Vaping status: Never Used   Substance Use Topics    Alcohol use: Yes     Comment: socially    Drug use: Never       Family History   Problem Relation Age of Onset    Heart disease Mother     Hypertension Mother     Depression Mother     Heart disease Father     Hypertension Father     Kidney nephrosis Father     Diabetes Paternal Grandmother     Breast cancer Paternal Grandmother     Ovarian cancer Neg Hx     Uterine cancer Neg Hx     Colon cancer Neg Hx     Pulmonary embolism Neg Hx     Deep vein thrombosis Neg Hx        Review of Systems   Constitutional:  Positive for activity change. Negative for appetite change, fatigue, fever and unexpected weight change.   Eyes:  Negative for photophobia and visual disturbance.   Respiratory:  Negative for cough and shortness of breath.    Cardiovascular:  Negative for chest pain and palpitations.   Gastrointestinal:  Negative for abdominal distention, abdominal pain, constipation, diarrhea and nausea.   Endocrine: Negative for cold intolerance and heat intolerance.   Genitourinary:  Positive for dyspareunia and vaginal pain. Negative for dysuria, flank pain, menstrual problem, pelvic pain and vaginal discharge.   Musculoskeletal:  Negative for back pain.   Skin:  Negative for color change and rash.   Neurological:  Negative for headaches.   Hematological:  Negative for adenopathy. Does not bruise/bleed easily.   Psychiatric/Behavioral:  Negative for dysphoric mood. The patient is not nervous/anxious.        /66   Ht 165.1 cm (65\")   Wt 68.8 kg (151 lb 9.6 oz)   BMI 25.23 kg/m²     Physical Exam  Vitals and nursing note reviewed. Exam conducted with a chaperone present.   Constitutional:       Appearance: Normal appearance. She is " well-developed and normal weight.   HENT:      Head: Normocephalic and atraumatic.   Eyes:      General: No scleral icterus.     Conjunctiva/sclera: Conjunctivae normal.   Neck:      Thyroid: No thyromegaly.   Cardiovascular:      Rate and Rhythm: Normal rate and regular rhythm.   Pulmonary:      Effort: Pulmonary effort is normal.      Breath sounds: Normal breath sounds.   Chest:   Breasts:     Right: No swelling, bleeding, inverted nipple, mass, nipple discharge, skin change or tenderness.      Left: No swelling, bleeding, inverted nipple, mass, nipple discharge, skin change or tenderness.      Comments: B implants noted  Abdominal:      General: Bowel sounds are normal. There is no distension.      Palpations: Abdomen is soft. There is no mass.      Tenderness: There is abdominal tenderness in the left lower quadrant. There is no guarding or rebound.      Hernia: No hernia is present.   Genitourinary:     Exam position: Supine.      Labia:         Right: No rash, tenderness or lesion.         Left: No rash, tenderness or lesion.       Urethra: No prolapse, urethral pain, urethral swelling or urethral lesion.      Vagina: No signs of injury and foreign body. No vaginal discharge, erythema or bleeding.      Uterus: Absent.       Adnexa:         Right: No mass, tenderness or fullness.          Left: No mass, tenderness or fullness.        Comments: Uterus and cervix absent  Normal cuff  Moderate atrophy  + LPS  Musculoskeletal:      Cervical back: Neck supple.   Skin:     General: Skin is warm and dry.   Neurological:      Mental Status: She is alert and oriented to person, place, and time.   Psychiatric:         Mood and Affect: Mood normal.         Behavior: Behavior normal.         Thought Content: Thought content normal.         Judgment: Judgment normal.            Assessment/Plan    1) GYN HM: pap/HPV  SBE demonstrated and encouraged.  2) STD screening: declines Condoms encouraged.  3) Bone health - Weight  bearing exercise, dietary calcium recommendations and vitamin D reviewed.   4) Diet and Exercise discussed  5) Smoking Status: yes I advised Aracelis of the risks of continuing to use tobacco, and I provided her with tobacco cessation educational materials in the After Visit Summary. During this visit, I spent 3 minutes counseling the patient regarding tobacco cessation.  6) Vaginal atrophy- ERX Estrace cream x2/week.Patient counseled that vaginal estrogen rings, creams and tablets are available and highly effective at treating local vaginal symptoms such as atrophy and vaginal dryness.  Vaginal estrogen does not cause uterine overgrowth and does not require a progestogen to protect the uterus.  Very small amounts of estrogen are absorbed systemically.  For patients with a history of an estrogen dependent cancer such as breast cancer, the decision to use local estrogen for local vaginal symptoms should be made after consultation with her oncologist.  Possible side effects include local irritation or burning and/or vaginal bleeding and should always be reported.    7)MMG: UTD 2/2023, B2. schedule MMG [place]  8) DEXA- UTD 1/2022 osteopenia with LROF 6.5 & 0.6 %.Repeat DEXA now  9)C scope- UTD 4/2022 repeat in 5 years  10)Vaginismus- refer Sue Foss PT  11)Follow up prn and 1 year annual   12) Parts of this document have been copied or forwarded from her previous visits and have been reviewed, updated and edited as indicated.   13)  I spent > 10  minutes on the separately reported service of vaginal atrophy and vaginismus. This time is not included in the time used to support the annual E/M service also reported today.         Diagnoses and all orders for this visit:    1. Routine gynecological examination (Primary)  -     IGP, Apt HPV,rfx 16 / 18,45    2. Screening for human papillomavirus (HPV)  -     IGP, Apt HPV,rfx 16 / 18,45    3. Vaginismus  -     Ambulatory Referral to Physical Therapy for Evaluation &  Treatment    4. Screening for osteoporosis  -     DEXA Bone Density Axial; Future    5. Encounter for screening mammogram for malignant neoplasm of breast  -     Mammo Screening Digital Tomosynthesis Bilateral With CAD; Future    6. Vaginal atrophy    Other orders  -     estradiol (ESTRACE) 0.1 MG/GM vaginal cream; Insert 1 g into the vagina 2 (Two) Times a Week.  Dispense: 45 g; Refill: 6          Cate Nixon MD  5/17/2024    17:31 EDT

## 2024-05-22 LAB
CYTOLOGIST CVX/VAG CYTO: NORMAL
CYTOLOGY CVX/VAG DOC CYTO: NORMAL
CYTOLOGY CVX/VAG DOC THIN PREP: NORMAL
DX ICD CODE: NORMAL
HPV I/H RISK 4 DNA CVX QL PROBE+SIG AMP: NEGATIVE
Lab: NORMAL
OTHER STN SPEC: NORMAL
STAT OF ADQ CVX/VAG CYTO-IMP: NORMAL

## 2024-07-24 ENCOUNTER — APPOINTMENT (OUTPATIENT)
Dept: BONE DENSITY | Facility: HOSPITAL | Age: 59
End: 2024-07-24
Payer: COMMERCIAL

## 2024-07-24 ENCOUNTER — HOSPITAL ENCOUNTER (OUTPATIENT)
Dept: MAMMOGRAPHY | Facility: HOSPITAL | Age: 59
Discharge: HOME | End: 2024-07-24
Payer: COMMERCIAL

## 2024-07-24 DIAGNOSIS — Z12.31 ENCOUNTER FOR SCREENING MAMMOGRAM FOR MALIGNANT NEOPLASM OF BREAST: ICD-10-CM

## 2024-07-24 DIAGNOSIS — Z13.820 SCREENING FOR OSTEOPOROSIS: ICD-10-CM

## 2024-07-24 PROCEDURE — 77067 SCR MAMMO BI INCL CAD: CPT

## 2024-07-24 PROCEDURE — 77080 DXA BONE DENSITY AXIAL: CPT

## 2024-07-24 PROCEDURE — 77067 SCR MAMMO BI INCL CAD: CPT | Performed by: RADIOLOGY

## 2024-07-24 PROCEDURE — 77063 BREAST TOMOSYNTHESIS BI: CPT

## 2024-07-24 PROCEDURE — 77063 BREAST TOMOSYNTHESIS BI: CPT | Performed by: RADIOLOGY

## 2024-07-25 NOTE — PROGRESS NOTES
PIP= DEXA shows osteopenia with an elevated risk of fracture. Is she interested in being on an oral medication to help decrease her risk of osteoporosis?

## 2024-11-20 NOTE — PROGRESS NOTES
How Severe Is Your Skin Lesion?: moderate PIP= normal findings on MMG Has Your Skin Lesion Been Treated?: not been treated Is This A New Presentation, Or A Follow-Up?: Skin Lesion

## (undated) DEVICE — KT ORCA ORCAPOD DISP STRL

## (undated) DEVICE — JACKT LAB F/R KNIT CUFF/COLR XLG BLU

## (undated) DEVICE — BNDG ELAS CO-FLEX SLF ADHR 2IN 5YD LF STRL

## (undated) DEVICE — SYR LL 3CC

## (undated) DEVICE — THE SINGLE USE ETRAP – POLYP TRAP IS USED FOR SUCTION RETRIEVAL OF ENDOSCOPICALLY REMOVED POLYPS.: Brand: ETRAP

## (undated) DEVICE — PK ORTHO MINOR TOWER 40

## (undated) DEVICE — SPNG GZ WOVN 4X4IN 12PLY 10/BX STRL

## (undated) DEVICE — GLV SURG BIOGEL LTX PF 8

## (undated) DEVICE — GLV SURG SENSICARE PI MIC PF SZ7.5 LF STRL

## (undated) DEVICE — DRSNG GZ PETROLTM XEROFORM CURAD 1X8IN STRL

## (undated) DEVICE — VIAL FORMALIN CAP 10P 40ML

## (undated) DEVICE — ADAPT CLN BIOGUARD AIR/H2O DISP

## (undated) DEVICE — SAFELINER SUCTION CANISTER 1000CC: Brand: DEROYAL

## (undated) DEVICE — BW-412T DISP COMBO CLEANING BRUSH: Brand: SINGLE USE COMBINATION CLEANING BRUSH

## (undated) DEVICE — DISPOSABLE TOURNIQUET CUFF SINGLE BLADDER, SINGLE PORT AND QUICK CONNECT CONNECTOR: Brand: COLOR CUFF

## (undated) DEVICE — SNAR POLYP SENSATION STDOVL 27 240 BX40

## (undated) DEVICE — THE BITE BLOCK MAXI, LATEX FREE STRAP IS USED TO PROTECT THE ENDOSCOPE INSERTION TUBE FROM BEING BITTEN BY THE PATIENT.

## (undated) DEVICE — Device

## (undated) DEVICE — DRP C/ARM FOR OEC MINI 6600 SYS 40X84IN

## (undated) DEVICE — SKIN PREP TRAY W/CHG: Brand: MEDLINE INDUSTRIES, INC.

## (undated) DEVICE — DRP C/ARM 41X74IN

## (undated) DEVICE — FRCP BX RADJAW4 NDL 2.8 240CM LG OG BX40

## (undated) DEVICE — BALL PIN JURGAN .062 GRN

## (undated) DEVICE — BNDG GZ SOF-FORM CONFRM 2X75IN LF STRL